# Patient Record
Sex: MALE | Race: WHITE | Employment: OTHER | URBAN - NONMETROPOLITAN AREA
[De-identification: names, ages, dates, MRNs, and addresses within clinical notes are randomized per-mention and may not be internally consistent; named-entity substitution may affect disease eponyms.]

---

## 2017-02-15 ENCOUNTER — ALLIED HEALTH/NURSE VISIT (OUTPATIENT)
Dept: FAMILY MEDICINE | Facility: CLINIC | Age: 77
End: 2017-02-15
Payer: COMMERCIAL

## 2017-02-15 VITALS — HEART RATE: 60 BPM | DIASTOLIC BLOOD PRESSURE: 76 MMHG | SYSTOLIC BLOOD PRESSURE: 124 MMHG

## 2017-02-15 DIAGNOSIS — I10 ESSENTIAL HYPERTENSION, BENIGN: Primary | ICD-10-CM

## 2017-02-15 PROCEDURE — 99207 ZZC NO CHARGE NURSE ONLY: CPT | Performed by: FAMILY MEDICINE

## 2017-02-15 NOTE — NURSING NOTE
Dave Spann is enrolled/participating in the retail pharmacy Blood Pressure Goals Achievement Program (BPGAP).  Dave Spann was evaluated at [facility] on February 15, 2017 at which time his blood pressure was:    BP Readings from Last 3 Encounters:   02/15/17 124/76   10/25/16 132/64   07/07/16 136/70     Reviewed lifestyle modifications for blood pressure control and reduction: including making healthy food choices, managing weight, getting regular exercise, smoking cessation, reducing alcohol consumption, monitoring blood pressure regularly.     Dave Spann is not experiencing symptoms.    Follow-Up: BP is at goal of < 140/90mmHg (patient 18+ years of age with or without diabetes).  Recommended follow-up at pharmacy in 6 months.     Completed by: [facility]  [phone]

## 2017-02-15 NOTE — MR AVS SNAPSHOT
After Visit Summary   2/15/2017    Dave Spann    MRN: [id number]           Patient Information     Date Of Birth          1940        Visit Information        Provider Department      2/15/2017 2:44 PM Raymon Huang MD [facility]        Today's Diagnoses     Essential hypertension, benign    -  1       Follow-ups after your visit        Who to contact     If you have questions or need follow up information about today's clinic visit or your schedule please contact [facility] directly at [phone].  Normal or non-critical lab and imaging results will be communicated to you by MyChart, letter or phone within 4 business days after the clinic has received the results. If you do not hear from us within 7 days, please contact the clinic through [employer] or phone. If you have a critical or abnormal lab result, we will notify you by phone as soon as possible.  Submit refill requests through [employer] or call your pharmacy and they will forward the refill request to us. Please allow 3 business days for your refill to be completed.          Additional Information About Your Visit        MyChart Information     [employer] gives you secure access to your electronic health record. If you see a primary care provider, you can also send messages to your care team and make appointments. If you have questions, please call your primary care clinic.  If you do not have a primary care provider, please call [phone] and they will assist you.        Care EveryWhere ID     This is your Care EveryWhere ID. This could be used by other organizations to access your [facility] medical records  [id number]        Your Vitals Were     Pulse                   60            Blood Pressure from Last 3 Encounters:   02/15/17 124/76   10/25/16 132/64   07/07/16 136/70    Weight from Last 3 Encounters:   07/07/16 176 lb 3.2 oz (79.9 kg)   04/01/16 182 lb 8.6 oz (82.8 kg)   07/22/15 171 lb  (77.6 kg)              Today, you had the following     No orders found for display       Primary Care Provider Office Phone # Fax #    Raymon Huang [phone]       [facility] [address] [facility] [address]        Thank you!     Thank you for choosing [facility]  for your care. Our goal is always to provide you with excellent care. Hearing back from our patients is one way we can continue to improve our services. Please take a few minutes to complete the written survey that you may receive in the mail after your visit with us. Thank you!             Your Updated Medication List - Protect others around you: Learn how to safely use, store and throw away your medicines at www.disposemymeds.org.          This list is accurate as of: 2/15/17  2:47 PM.  Always use your most recent med list.                   Brand Name Dispense Instructions for use    aspirin 81 MG tablet      1 TABLET DAILY       atorvastatin 40 MG tablet    LIPITOR    90 tablet    Take 1 tablet (40 mg) by mouth At Bedtime       CALCIUM 500 500-250-200 MG-MG-UNIT Tabs   Generic drug:  Calcium-Magnesium-Vitamin D      Take 1 tablet by mouth 2 times daily       doxazosin 4 MG tablet    CARDURA    90 tablet    Take 1 tablet (4 mg) by mouth At Bedtime       flaxseed oil 1000 MG Caps      1 DAILY       losartan 100 MG tablet    COZAAR    90 tablet    Take 1 tablet (100 mg) by mouth daily       metoprolol 100 MG tablet    LOPRESSOR    180 tablet    Take 1 tablet (100 mg) by mouth 2 times daily       MULTI VITAMIN MENS PO      Take 1 tablet by mouth daily.

## 2017-05-30 ENCOUNTER — OFFICE VISIT (OUTPATIENT)
Dept: FAMILY MEDICINE | Facility: CLINIC | Age: 77
End: 2017-05-30
Payer: COMMERCIAL

## 2017-05-30 VITALS
HEART RATE: 67 BPM | DIASTOLIC BLOOD PRESSURE: 86 MMHG | TEMPERATURE: 98.1 F | OXYGEN SATURATION: 97 % | WEIGHT: 184 LBS | SYSTOLIC BLOOD PRESSURE: 146 MMHG | RESPIRATION RATE: 12 BRPM | HEIGHT: 69 IN | BODY MASS INDEX: 27.25 KG/M2
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
## 2017-05-30 PROCEDURE — 36415 COLL VENOUS BLD VENIPUNCTURE: CPT | Performed by: FAMILY MEDICINE

## 2017-05-30 PROCEDURE — 86618 LYME DISEASE ANTIBODY: CPT | Performed by: FAMILY MEDICINE

## 2017-05-30 PROCEDURE — 99213 OFFICE O/P EST LOW 20 MIN: CPT | Performed by: FAMILY MEDICINE

## 2017-05-30 RX ORDER — DOXYCYCLINE 100 MG/1
100 CAPSULE ORAL 2 TIMES DAILY
Qty: 28 CAPSULE | Refills: 0 | Status: SHIPPED | OUTPATIENT
Start: 2017-05-30 | End: 2017-07-12

## 2017-05-30 NOTE — NURSING NOTE
"Chief Complaint   Patient presents with     Insect Bites     had tick bite about 2 weeks ago - now itches and has ring around it       Initial There were no vitals taken for this visit. Estimated body mass index is 26.21 kg/(m^2) as calculated from the following:    Height as of 7/7/16: 5' 8.75\" (1.746 m).    Weight as of 7/7/16: 176 lb 3.2 oz (79.9 kg).  Medication Reconciliation: complete      Health Maintenance that is potentially due pending provider review:  NONE    n/a      "

## 2017-05-30 NOTE — MR AVS SNAPSHOT
After Visit Summary   5/30/2017    Dave Spann    MRN: [id number]           Patient Information     Date Of Birth          1940        Visit Information        Provider Department      5/30/2017 2:20 PM Raymon Galvez MD [facility]        Today's Diagnoses     Tick bite, initial encounter    -  1      Care Instructions    1. I will do labs and get you the results.    2. Take the antibiotic for two weeks    3. Should clear soon.           Follow-ups after your visit        Your next 10 appointments already scheduled     Jun 05, 2017  9:00 AM CDT   Return Visit with Vijaya Hernandez MD   [facility] PHYSICIAN HEART AT [facility] ([facility])    [address] [address] [phone]              Who to contact     If you have questions or need follow up information about today's clinic visit or your schedule please contact [facility] directly at [phone].  Normal or non-critical lab and imaging results will be communicated to you by [employer], letter or phone within 4 business days after the clinic has received the results. If you do not hear from us within 7 days, please contact the clinic through [employer] or phone. If you have a critical or abnormal lab result, we will notify you by phone as soon as possible.  Submit refill requests through [employer] or call your pharmacy and they will forward the refill request to us. Please allow 3 business days for your refill to be completed.          Additional Information About Your Visit        MyChart Information     [employer] gives you secure access to your electronic health record. If you see a primary care provider, you can also send messages to your care team and make appointments. If you have questions, please call your primary care clinic.  If you do not have a primary care provider, please call [phone] and they will assist you.        Care  "EveryWhere ID     This is your Care EveryWhere ID. This could be used by other organizations to access your [facility] medical records  [id number]        Your Vitals Were     Pulse Temperature Respirations Height Pulse Oximetry BMI (Body Mass Index)    67 98.1  F (36.7  C) (Tympanic) 12 5' 8.75\" (1.746 m) 97% 27.37 kg/m2       Blood Pressure from Last 3 Encounters:   05/30/17 146/86   02/15/17 124/76   10/25/16 132/64    Weight from Last 3 Encounters:   05/30/17 184 lb (83.5 kg)   07/07/16 176 lb 3.2 oz (79.9 kg)   04/01/16 182 lb 8.6 oz (82.8 kg)              Today, you had the following     No orders found for display         Today's Medication Changes          These changes are accurate as of: 5/30/17  2:43 PM.  If you have any questions, ask your nurse or doctor.               Start taking these medicines.        Dose/Directions    doxycycline 100 MG capsule   Commonly known as:  VIBRAMYCIN   Used for:  Tick bite, initial encounter   Started by:  Raymon Galvez MD        Dose:  100 mg   Take 1 capsule (100 mg) by mouth 2 times daily   Quantity:  28 capsule   Refills:  0            Where to get your medicines      These medications were sent to [facility] [address] [address]     Phone:  [phone]     doxycycline 100 MG capsule                Primary Care Provider Office Phone # Fax #    Raymon Huang [phone]       [place] [id number] [place] [address]        Thank you!     Thank you for choosing [facility]  for your care. Our goal is always to provide you with excellent care. Hearing back from our patients is one way we can continue to improve our services. Please take a few minutes to complete the written survey that you may receive in the mail after your visit with us. Thank you!             Your Updated Medication List - Protect others around you: Learn how to " safely use, store and throw away your medicines at www.disposemymeds.org.          This list is accurate as of: 5/30/17  2:43 PM.  Always use your most recent med list.                   Brand Name Dispense Instructions for use    aspirin 81 MG tablet      1 TABLET DAILY       atorvastatin 40 MG tablet    LIPITOR    90 tablet    Take 1 tablet (40 mg) by mouth At Bedtime       CALCIUM 500 500-250-200 MG-MG-UNIT Tabs   Generic drug:  Calcium-Magnesium-Vitamin D      Take 1 tablet by mouth 2 times daily       doxazosin 4 MG tablet    CARDURA    90 tablet    Take 1 tablet (4 mg) by mouth At Bedtime       doxycycline 100 MG capsule    VIBRAMYCIN    28 capsule    Take 1 capsule (100 mg) by mouth 2 times daily       flaxseed oil 1000 MG Caps      1 DAILY       losartan 100 MG tablet    COZAAR    90 tablet    Take 1 tablet (100 mg) by mouth daily       metoprolol 100 MG tablet    LOPRESSOR    180 tablet    Take 1 tablet (100 mg) by mouth 2 times daily       MULTI VITAMIN MENS PO      Take 1 tablet by mouth daily.

## 2017-05-30 NOTE — PROGRESS NOTES
SUBJECTIVE:                                                    Dave Spann is a 76 year old male who presents to clinic today for the following health issues: two week ago tick bite and now growing rash on the left tibia.     Insect Bite      Duration: 2 weeks     Description (location/character/radiation): left ankle    Intensity:  mild    Accompanying signs and symptoms: itching and red ring around site    History (similar episodes/previous evaluation): None    Precipitating or alleviating factors: None    Therapies tried and outcome: None       Problem list and histories reviewed & adjusted, as indicated.  Additional history: as documented    Patient Active Problem List   Diagnosis     Lipoma of other specified sites     TINEA VERSICOLOR     Hypertension goal BP (blood pressure) < 140/90     Adrenal adenoma     Delayed gastric emptying     OA (osteoarthritis)     BPH (benign prostatic hypertrophy)     HYPERLIPIDEMIA LDL GOAL <130     Advanced directives, counseling/discussion     Health Care Home     Prediabetes     Cataract, right eye     ASCVD (arteriosclerotic cardiovascular disease)     Tachycardia     CHF (congestive heart failure) (H)     Thyroiditis     Past Surgical History:   Procedure Laterality Date     BIOPSY  1983    Hair loss (pseudopelade)     BIOPSY  1985    Pseudopelade (Hair loss)     COLONOSCOPY  September 2007    repeat 5 years     CORONARY ANGIOGRAPHY ADULT ORDER  2/20/15     ENDOSCOPY  09/2009    Upper GI     EYE SURGERY  2013    Removal of cataracts, both eyes     EYE SURGERY  10/2013    Removal of cataracts, both eyes     HERNIA REPAIR, INGUINAL RT/LT  05/24/2010    left side     HERNIORRHAPHY INGUINAL  12/20/2010    HERNIORRHAPHY INGUINAL performed by BIBI PARKER at [facility]     PAST SURGICAL HISTORY      [place] teeth     SURGICAL HISTORY OF -   2002    Colonoscopy-Repeat in 5 years-polyp       Social History   Substance Use Topics     Smoking status: Never Smoker     Smokeless  tobacco: Never Used     Alcohol use Yes      Comment: One beer per year; sacramental wine     Family History   Problem Relation Age of Onset     CANCER Mother      breast and skin     Hypertension Mother      Breast Cancer Mother      Other Cancer Mother      Skin     Alcohol/Drug Maternal Grandmother      Genitourinary Problems Maternal Grandfather      std,     HEART DISEASE Maternal Grandfather      MI     Cancer - colorectal Maternal Grandfather      HEART DISEASE Brother      CHF,    MI     Hypertension Brother      CANCER Father      ?lymphoma     Other Cancer Father      CANCER Paternal Grandmother      Thyroid Disease Sister      Prostate Cancer No family hx of          Current Outpatient Prescriptions   Medication Sig Dispense Refill     doxycycline (VIBRAMYCIN) 100 MG capsule Take 1 capsule (100 mg) by mouth 2 times daily 28 capsule 0     metoprolol (LOPRESSOR) 100 MG tablet Take 1 tablet (100 mg) by mouth 2 times daily 180 tablet 3     atorvastatin (LIPITOR) 40 MG tablet Take 1 tablet (40 mg) by mouth At Bedtime 90 tablet 3     losartan (COZAAR) 100 MG tablet Take 1 tablet (100 mg) by mouth daily 90 tablet 3     doxazosin (CARDURA) 4 MG tablet Take 1 tablet (4 mg) by mouth At Bedtime 90 tablet 3     Calcium-Magnesium-Vitamin D (CALCIUM 500) 500-250-200 MG-MG-UNIT TABS Take 1 tablet by mouth 2 times daily       Multiple Vitamin (MULTI VITAMIN MENS PO) Take 1 tablet by mouth daily.       ASPIRIN 81 MG OR TABS 1 TABLET DAILY       FLAXSEED OIL 1000 MG OR CAPS 1 DAILY  0       Reviewed and updated as needed this visit by clinical staff  Tobacco  Allergies  Meds  Med Hx  Surg Hx  Fam Hx  Soc Hx      Reviewed and updated as needed this visit by Provider         ROS:  C: NEGATIVE for fever, chills, change in weight  E/M: NEGATIVE for ear, mouth and throat problems  R: NEGATIVE for significant cough or SOB  CV: NEGATIVE for chest pain, palpitations or peripheral edema    OBJECTIVE:                            "                         /86 (BP Location: Right arm, Patient Position: Chair, Cuff Size: Adult Regular)  Pulse 67  Temp 98.1  F (36.7  C) (Tympanic)  Resp 12  Ht 5' 8.75\" (1.746 m)  Wt 184 lb (83.5 kg)  SpO2 97%  BMI 27.37 kg/m2  Body mass index is 27.37 kg/(m^2).  GENERAL: healthy, alert and no distress  NECK: no adenopathy, no asymmetry, masses, or scars and thyroid normal to palpation  RESP: lungs clear to auscultation - no rales, rhonchi or wheezes  SKIN: rash on left tibia with bulleye lesion          ASSESSMENT/PLAN:                                                            1. Tick bite, initial encounter  possile lyme disease   - doxycycline (VIBRAMYCIN) 100 MG capsule; Take 1 capsule (100 mg) by mouth 2 times daily  Dispense: 28 capsule; Refill: 0    ASSESSMENT/PLAN:      ICD-10-CM    1. Tick bite, initial encounter W57.XXXA doxycycline (VIBRAMYCIN) 100 MG capsule       Patient Instructions   1. I will do labs and get you the results.    2. Take the antibiotic for two weeks    3. Should clear soon.           Raymon Galvez MD  [facility]  "

## 2017-05-30 NOTE — PATIENT INSTRUCTIONS
1. I will do labs and get you the results.    2. Take the antibiotic for two weeks    3. Should clear soon.

## 2017-06-01 LAB — B BURGDOR IGG+IGM SER QL: 0.04 (ref 0–0.89)

## 2017-06-05 ENCOUNTER — OFFICE VISIT (OUTPATIENT)
Dept: CARDIOLOGY | Facility: CLINIC | Age: 77
End: 2017-06-05
Attending: INTERNAL MEDICINE
Payer: COMMERCIAL

## 2017-06-05 VITALS
WEIGHT: 185.2 LBS | BODY MASS INDEX: 27.55 KG/M2 | OXYGEN SATURATION: 98 % | HEART RATE: 60 BPM | SYSTOLIC BLOOD PRESSURE: 152 MMHG | DIASTOLIC BLOOD PRESSURE: 76 MMHG

## 2017-06-05 DIAGNOSIS — I25.10 CORONARY ARTERY DISEASE INVOLVING NATIVE HEART WITHOUT ANGINA PECTORIS, UNSPECIFIED VESSEL OR LESION TYPE: ICD-10-CM

## 2017-06-05 PROCEDURE — 99214 OFFICE O/P EST MOD 30 MIN: CPT | Performed by: INTERNAL MEDICINE

## 2017-06-05 RX ORDER — HYDROCHLOROTHIAZIDE 12.5 MG/1
12.5 TABLET ORAL DAILY
Qty: 90 TABLET | Refills: 3 | Status: SHIPPED | OUTPATIENT
Start: 2017-06-05 | End: 2018-05-30

## 2017-06-05 NOTE — MR AVS SNAPSHOT
After Visit Summary   6/5/2017    Dave Spann    MRN: [id number]           Patient Information     Date Of Birth          1940        Visit Information        Provider Department      6/5/2017 9:00 AM Vijaya Hernandez MD [facility] PHYSICIAN HEART AT [facility]        Today's Diagnoses     Coronary artery disease involving native heart without angina pectoris, unspecified vessel or lesion type          Care Instructions    Thank you for your  Heart Care visit today. Your provider has recommended the following:  Medication Changes:  Start HCTZ 12.5mg daily    Recommendations:  Have lab work next week  Have Echocardiogram in 1 year    Follow-up:  See Dr. Hernandez for cardiology follow up in 1 year.    We kindly ask that you call cardiology scheduling at [phone] three months prior to requested revisit date to schedule future cardiology appointments.  Reminder:  1. Please bring in your current medication list or your medication, over the counter supplements and vitamin bottles as we will review these at each office visit.               [facility]~[address]~[place], [place]~[address]  Questions about your visit today?  Call your Cardiology Clinic RN's-Belén Pena and/or Jackie Leach at [phone].          Follow-ups after your visit        Additional Services     Follow-Up with Cardiologist                 Future tests that were ordered for you today     Open Future Orders        Priority Expected Expires Ordered    Echocardiogram Routine 6/5/2018 7/10/2018 6/5/2017    Follow-Up with Cardiologist Routine 6/5/2018 10/18/2018 6/5/2017    Basic metabolic panel Routine 6/12/2017 6/5/2018 6/5/2017            Who to contact     If you have questions or need follow up information about today's clinic visit or your schedule please contact [facility] PHYSICIAN HEART AT [facility] directly at  [phone].  Normal or non-critical lab and imaging results will be communicated to you by MyChart, letter or phone within 4 business days after the clinic has received the results. If you do not hear from us within 7 days, please contact the clinic through [employer] or phone. If you have a critical or abnormal lab result, we will notify you by phone as soon as possible.  Submit refill requests through [employer] or call your pharmacy and they will forward the refill request to us. Please allow 3 business days for your refill to be completed.          Additional Information About Your Visit        [employer] Information     [employer] gives you secure access to your electronic health record. If you see a primary care provider, you can also send messages to your care team and make appointments. If you have questions, please call your primary care clinic.  If you do not have a primary care provider, please call [phone] and they will assist you.        Care EveryWhere ID     This is your Care EveryWhere ID. This could be used by other organizations to access your [facility] medical records  [id number]        Your Vitals Were     Pulse Pulse Oximetry BMI (Body Mass Index)             60 98% 27.55 kg/m2          Blood Pressure from Last 3 Encounters:   06/05/17 152/76   05/30/17 146/86   02/15/17 124/76    Weight from Last 3 Encounters:   06/05/17 84 kg (185 lb 3.2 oz)   05/30/17 83.5 kg (184 lb)   07/07/16 79.9 kg (176 lb 3.2 oz)              We Performed the Following     Follow-Up with Cardiologist          Today's Medication Changes          These changes are accurate as of: 6/5/17  9:09 AM.  If you have any questions, ask your nurse or doctor.               Start taking these medicines.        Dose/Directions    hydrochlorothiazide 12.5 MG Tabs tablet   Used for:  Coronary artery disease involving native heart without angina pectoris, unspecified vessel or lesion type   Started by:  Vijaya Hernandez MD         Dose:  12.5 mg   Take 1 tablet (12.5 mg) by mouth daily   Quantity:  90 tablet   Refills:  3            Where to get your medicines      These medications were sent to [facility] - [address] [facility] [address]     Phone:  [phone]     hydrochlorothiazide 12.5 MG Tabs tablet                Primary Care Provider Office Phone # Fax #    Raymon Huang [phone]       [facility] [address] [facility] [address]        Thank you!     Thank you for choosing [facility] PHYSICIAN HEART AT [facility]  for your care. Our goal is always to provide you with excellent care. Hearing back from our patients is one way we can continue to improve our services. Please take a few minutes to complete the written survey that you may receive in the mail after your visit with us. Thank you!             Your Updated Medication List - Protect others around you: Learn how to safely use, store and throw away your medicines at www.disposemymeds.org.          This list is accurate as of: 6/5/17  9:09 AM.  Always use your most recent med list.                   Brand Name Dispense Instructions for use    aspirin 81 MG tablet      1 TABLET DAILY       atorvastatin 40 MG tablet    LIPITOR    90 tablet    Take 1 tablet (40 mg) by mouth At Bedtime       CALCIUM 500 500-250-200 MG-MG-UNIT Tabs   Generic drug:  Calcium-Magnesium-Vitamin D      Take 1 tablet by mouth 2 times daily       doxazosin 4 MG tablet    CARDURA    90 tablet    Take 1 tablet (4 mg) by mouth At Bedtime       doxycycline 100 MG capsule    VIBRAMYCIN    28 capsule    Take 1 capsule (100 mg) by mouth 2 times daily       flaxseed oil 1000 MG Caps      1 DAILY       hydrochlorothiazide 12.5 MG Tabs tablet     90 tablet    Take 1 tablet (12.5 mg) by mouth daily       losartan 100 MG tablet    COZAAR    90 tablet    Take 1 tablet (100 mg) by mouth daily       metoprolol  100 MG tablet    LOPRESSOR    180 tablet    Take 1 tablet (100 mg) by mouth 2 times daily       MULTI VITAMIN MENS PO      Take 1 tablet by mouth daily.

## 2017-06-05 NOTE — PROGRESS NOTES
Dear Dr. Huang:      I had the pleasure to follow up with your patient, Mr. Dave Spann, in the Cardiovascular Medicine Clinic.  As you recall, this is a gentleman who in Feb 2015 was transferred to [facility] for coronary angiogram.  Briefly, this study was performed due to chest discomfort with exertion.  There was concern regarding this representing a high-grade coronary artery lesion.  He underwent coronary angiogram via right radial approach demonstrating intermediate disease in the LAD which was evaluated by fractional flow reserve assessment.  This, fortunately, was 0.84 suggestive of nonobstructive disease.  He also was uncovered to have hyperthyroidism and has been maintained on beta blocker therapy since that time.  He is scheduled to have an evaluation next week for this and treatment as well.  From a cardiac perspective, he reports no additional symptoms.  He has gone back to his normal level of functioning.  In the winter months, he is quite active in the garage.  He likes woodworking and in the summer he has a large garden which he tends to.      Had thyroiditis which has since resolved.    He has no active cardiac complaints. LE edema with Norvasc which has been discontinued.  BP has been elevated on office visits and home readings.       PHYSICAL EXAMINATION:   VITAL SIGNS:  Blood pressure 152/76, pulse 60, weight 84 kg (185 lb 3.2 oz), SpO2 98 %.  GENERAL:  The patient is alert, oriented, well-dressed, well-appearing male in no apparent distress.   HEENT:  Oropharynx clear, no sinus tenderness.   NECK:  No JVP, no lymphadenopathy, no carotid bruits.   CARDIOVASCULAR:  Distant heart tones, normal S1 and S2.  No murmurs, gallops or rubs.   LUNGS:  Clear to auscultation.   ABDOMEN:  Soft, nontender and nondistended.      ASSESSMENT AND PLAN:   1. Atherosclerotic coronary disease, mild, nonobstructive.   2. Hypertension.   3. Hyperlipidemia.        RECOMMENDATIONS:   1. Atherosclerotic  coronary artery disease.  At this point, the patient is stable.  We have reviewed his recent coronary angiogram.    2. Hyperlipidemia.  Let us continue with his atorvastatin.   3. HTN:  On lopressor and losartan.  Will add HCTZ 12.5 mg and check BMP in ~ 2 weeks at [place]   4. RTC in one year with pre clinic ehocardiogram    Vijaya Hernandez MD

## 2017-06-05 NOTE — PATIENT INSTRUCTIONS
Thank you for your [facility] visit today. Your provider has recommended the following:  Medication Changes:  Start HCTZ 12.5mg daily    Recommendations:  Have lab work next week  Have Echocardiogram in 1 year    Follow-up:  See Dr. Hernandez for cardiology follow up in 1 year.    We kindly ask that you call cardiology scheduling at [phone] three months prior to requested revisit date to schedule future cardiology appointments.  Reminder:  1. Please bring in your current medication list or your medication, over the counter supplements and vitamin bottles as we will review these at each office visit.               [facility]  [facility]~[address]~[place], [place]~[address]  Questions about your visit today?  Call your Cardiology Clinic RN's-Belén Pena and/or Jackie Leach at [phone].

## 2017-06-05 NOTE — LETTER
6/5/2017    Raymon Huang MD  [facility]   [address] [facility] [address]    RE: Dave Spann       Dear Colleague,    I had the pleasure to follow up with your patient, Mr. Dave Spann, in the Cardiovascular Medicine Clinic.  As you recall, this is a gentleman who in Feb 2015 was transferred to [facility] for coronary angiogram.  Briefly, this study was performed due to chest discomfort with exertion.  There was concern regarding this representing a high-grade coronary artery lesion.  He underwent coronary angiogram via right radial approach demonstrating intermediate disease in the LAD which was evaluated by fractional flow reserve assessment.  This, fortunately, was 0.84 suggestive of nonobstructive disease.  He also was uncovered to have hyperthyroidism and has been maintained on beta blocker therapy since that time.  He is scheduled to have an evaluation next week for this and treatment as well.  From a cardiac perspective, he reports no additional symptoms.  He has gone back to his normal level of functioning.  In the winter months, he is quite active in the garage.  He likes woodworking and in the summer he has a large garden which he tends to.      Had thyroiditis which has since resolved.    He has no active cardiac complaints. LE edema with Norvasc which has been discontinued.  BP has been elevated on office visits and home readings.       PHYSICAL EXAMINATION:   VITAL SIGNS:  Blood pressure 152/76, pulse 60, weight 84 kg (185 lb 3.2 oz), SpO2 98 %.  GENERAL:  The patient is alert, oriented, well-dressed, well-appearing male in no apparent distress.   HEENT:  Oropharynx clear, no sinus tenderness.   NECK:  No JVP, no lymphadenopathy, no carotid bruits.   CARDIOVASCULAR:  Distant heart tones, normal S1 and S2.  No murmurs, gallops or rubs.   LUNGS:  Clear to auscultation.   ABDOMEN:  Soft, nontender and nondistended.      ASSESSMENT AND PLAN:   1. Atherosclerotic  coronary disease, mild, nonobstructive.   2. Hypertension.   3. Hyperlipidemia.        RECOMMENDATIONS:   1. Atherosclerotic coronary artery disease.  At this point, the patient is stable.  We have reviewed his recent coronary angiogram.    2. Hyperlipidemia.  Let us continue with his atorvastatin.   3. HTN:  On lopressor and losartan.  Will add HCTZ 12.5 mg and check BMP in ~ 2 weeks at [place]   4. RTC in one year with pre clinic ehocardiogram    Thank you for allowing me to participate in the care of your patient.    Sincerely,     Vijaya Hernandez MD     [facility]

## 2017-06-13 DIAGNOSIS — I25.10 CORONARY ARTERY DISEASE INVOLVING NATIVE HEART WITHOUT ANGINA PECTORIS, UNSPECIFIED VESSEL OR LESION TYPE: ICD-10-CM

## 2017-06-13 LAB
ANION GAP SERPL CALCULATED.3IONS-SCNC: 6 MMOL/L (ref 3–14)
BUN SERPL-MCNC: 23 MG/DL (ref 7–30)
CALCIUM SERPL-MCNC: 9.3 MG/DL (ref 8.5–10.1)
CHLORIDE SERPL-SCNC: 102 MMOL/L (ref 94–109)
CO2 SERPL-SCNC: 26 MMOL/L (ref 20–32)
CREAT SERPL-MCNC: 0.98 MG/DL (ref 0.66–1.25)
GFR SERPL CREATININE-BSD FRML MDRD: 75 ML/MIN/1.7M2
GLUCOSE SERPL-MCNC: 111 MG/DL (ref 70–99)
POTASSIUM SERPL-SCNC: 4 MMOL/L (ref 3.4–5.3)
SODIUM SERPL-SCNC: 134 MMOL/L (ref 133–144)

## 2017-06-13 PROCEDURE — 80048 BASIC METABOLIC PNL TOTAL CA: CPT | Performed by: FAMILY MEDICINE

## 2017-06-13 PROCEDURE — 36415 COLL VENOUS BLD VENIPUNCTURE: CPT | Performed by: FAMILY MEDICINE

## 2017-07-01 DIAGNOSIS — E78.5 HYPERLIPIDEMIA LDL GOAL <130: ICD-10-CM

## 2017-07-03 RX ORDER — ATORVASTATIN CALCIUM 40 MG/1
TABLET, FILM COATED ORAL
Qty: 30 TABLET | Refills: 0 | Status: SHIPPED | OUTPATIENT
Start: 2017-07-03 | End: 2017-07-12

## 2017-07-03 NOTE — TELEPHONE ENCOUNTER
atorvastatin (LIPITOR) 40 MG tablet     Last Written Prescription Date: 07/07/2016  Last Fill Quantity: 90 tablet, # refills: 3  Last Office Visit with G, [facility] or [facility] prescribing provider: 05/03/2017  Next 5 appointments (look out 90 days)     Jul 12, 2017  8:40 AM CDT   PHYSICAL with Raymon Huang MD   [facility] ([facility]    [address] [place] [id number] [phone]                   Lab Results   Component Value Date    CHOL 104 07/07/2016     Lab Results   Component Value Date    HDL 50 07/07/2016     Lab Results   Component Value Date    LDL 44 07/07/2016     Lab Results   Component Value Date    TRIG 51 07/07/2016     Lab Results   Component Value Date    CHOLHDLRATIO 2.1 02/21/2015

## 2017-07-12 ENCOUNTER — OFFICE VISIT (OUTPATIENT)
Dept: FAMILY MEDICINE | Facility: CLINIC | Age: 77
End: 2017-07-12
Payer: COMMERCIAL

## 2017-07-12 VITALS
RESPIRATION RATE: 16 BRPM | DIASTOLIC BLOOD PRESSURE: 70 MMHG | SYSTOLIC BLOOD PRESSURE: 130 MMHG | WEIGHT: 175 LBS | HEIGHT: 69 IN | BODY MASS INDEX: 25.92 KG/M2 | HEART RATE: 60 BPM

## 2017-07-12 DIAGNOSIS — I10 ESSENTIAL HYPERTENSION WITH GOAL BLOOD PRESSURE LESS THAN 140/90: ICD-10-CM

## 2017-07-12 DIAGNOSIS — I50.9 CONGESTIVE HEART FAILURE, UNSPECIFIED CONGESTIVE HEART FAILURE CHRONICITY, UNSPECIFIED CONGESTIVE HEART FAILURE TYPE: ICD-10-CM

## 2017-07-12 DIAGNOSIS — Z00.00 MEDICARE ANNUAL WELLNESS VISIT, SUBSEQUENT: Primary | ICD-10-CM

## 2017-07-12 DIAGNOSIS — Z12.11 SPECIAL SCREENING FOR MALIGNANT NEOPLASMS, COLON: ICD-10-CM

## 2017-07-12 DIAGNOSIS — E78.5 HYPERLIPIDEMIA LDL GOAL <130: ICD-10-CM

## 2017-07-12 DIAGNOSIS — E06.9 THYROIDITIS: ICD-10-CM

## 2017-07-12 DIAGNOSIS — N40.1 BENIGN NON-NODULAR PROSTATIC HYPERPLASIA WITH LOWER URINARY TRACT SYMPTOMS: ICD-10-CM

## 2017-07-12 DIAGNOSIS — I25.10 ASCVD (ARTERIOSCLEROTIC CARDIOVASCULAR DISEASE): ICD-10-CM

## 2017-07-12 LAB
CHOLEST SERPL-MCNC: 109 MG/DL
HDLC SERPL-MCNC: 46 MG/DL
LDLC SERPL CALC-MCNC: 48 MG/DL
NONHDLC SERPL-MCNC: 63 MG/DL
TRIGL SERPL-MCNC: 73 MG/DL
TSH SERPL DL<=0.05 MIU/L-ACNC: 0.95 MU/L (ref 0.4–4)

## 2017-07-12 PROCEDURE — 99397 PER PM REEVAL EST PAT 65+ YR: CPT | Performed by: FAMILY MEDICINE

## 2017-07-12 PROCEDURE — 84443 ASSAY THYROID STIM HORMONE: CPT | Performed by: FAMILY MEDICINE

## 2017-07-12 PROCEDURE — 36415 COLL VENOUS BLD VENIPUNCTURE: CPT | Performed by: FAMILY MEDICINE

## 2017-07-12 PROCEDURE — 80061 LIPID PANEL: CPT | Performed by: FAMILY MEDICINE

## 2017-07-12 RX ORDER — DOXAZOSIN 8 MG/1
8 TABLET ORAL AT BEDTIME
Qty: 90 TABLET | Refills: 3 | Status: SHIPPED | OUTPATIENT
Start: 2017-07-12 | End: 2018-07-16

## 2017-07-12 RX ORDER — METOPROLOL TARTRATE 100 MG
100 TABLET ORAL 2 TIMES DAILY
Qty: 180 TABLET | Refills: 3 | Status: SHIPPED | OUTPATIENT
Start: 2017-07-12 | End: 2018-07-16

## 2017-07-12 RX ORDER — LOSARTAN POTASSIUM 100 MG/1
100 TABLET ORAL DAILY
Qty: 90 TABLET | Refills: 3 | Status: SHIPPED | OUTPATIENT
Start: 2017-07-12 | End: 2018-07-16

## 2017-07-12 RX ORDER — ATORVASTATIN CALCIUM 40 MG/1
TABLET, FILM COATED ORAL
Qty: 90 TABLET | Refills: 3 | Status: SHIPPED | OUTPATIENT
Start: 2017-07-12 | End: 2018-07-16

## 2017-07-12 NOTE — PATIENT INSTRUCTIONS
Preventive Health Recommendations:       Male Ages 65 and over    Yearly exam:             See your health care provider every year in order to  o   Review health changes.   o   Discuss preventive care.    o   Review your medicines if your doctor has prescribed any.    Talk with your health care provider about whether you should have a test to screen for prostate cancer (PSA).    Every 3 years, have a diabetes test (fasting glucose). If you are at risk for diabetes, you should have this test more often.    Every 5 years, have a cholesterol test. Have this test more often if you are at risk for high cholesterol or heart disease.     Every 10 years, have a colonoscopy. Or, have a yearly FIT test (stool test). These exams will check for colon cancer.    Talk to with your health care provider about screening for Abdominal Aortic Aneurysm if you have a family history of AAA or have a history of smoking.  Shots:     Get a flu shot each year.     Get a tetanus shot every 10 years.     Talk to your doctor about your pneumonia vaccines. There are now two you should receive - Pneumovax (PPSV 23) and Prevnar (PCV 13).    Talk to your doctor about a shingles vaccine.     Talk to your doctor about the hepatitis B vaccine.  Nutrition:     Eat at least 5 servings of fruits and vegetables each day.     Eat whole-grain bread, whole-wheat pasta and brown rice instead of white grains and rice.     Talk to your doctor about Calcium and Vitamin D.   Lifestyle    Exercise for at least 150 minutes a week (30 minutes a day, 5 days a week). This will help you control your weight and prevent disease.     Limit alcohol to one drink per day.     No smoking.     Wear sunscreen to prevent skin cancer.     See your dentist every six months for an exam and cleaning.     See your eye doctor every 1 to 2 years to screen for conditions such as glaucoma, macular degeneration and cataracts.    ASSESSMENT/PLAN:                                                     Lifestyle recommendations:continue current healthy lifestyle efforts including regular exercise and keeping a good weight  The following exams/tests were recommended and discussed for health maintenance:  Colonoscopy test is done at the hospital and if normal, no other testing is needed for 10 years.   When to stop colon cancer screening?  Experts agree that colon cancer screening is generally not recommended when life expectancy is <10 years and not at age over 85.  The Expert group USPSTF recommends against screening for ages 76 and older.  It is reasonable to stop screening for colon cancer sometime between age 76 and 85 for most individuals.   Prostate cancer screening is not recommended for older men, those age 70 and older or with anticipated lifespan <10 years.  The expert group USPSTF rcommmends against any PSA prostate cancer screening.    (Z00.00) Medicare annual wellness visit, subsequent  (primary encounter diagnosis)    (E78.5) Hyperlipidemia LDL goal <130  Comment: due for recheck  Plan: Lipid panel reflex to direct LDL, atorvastatin         (LIPITOR) 40 MG tablet        Fasting blood tests today.     (I25.10) ASCVD (arteriosclerotic cardiovascular disease)  Comment: doing well  Plan: metoprolol (LOPRESSOR) 100 MG tablet        No change in current treatment plan.     (I10) Essential hypertension with goal blood pressure less than 140/90  Comment: doing well  Plan: metoprolol (LOPRESSOR) 100 MG tablet, losartan         (COZAAR) 100 MG tablet, doxazosin (CARDURA) 8         MG tablet        No change in current treatment plan.  The Doxazosin is being increased for prostate symptoms.  This could lower blood pressure.     (N40.1) Benign non-nodular prostatic hyperplasia with lower urinary tract symptoms  Comment:   Plan: doxazosin (CARDURA) 8 MG tablet        Increase to 8mg daily to see if it helps with frequent urination at night.     (E06.9) Thyroiditis  Comment: check thyroid again today.    Plan: TSH          (I50.9) Congestive heart failure, unspecified congestive heart failure chronicity, unspecified congestive heart failure type (H)  Comment: stable  Plan: No change in current treatment plan.     (Z12.11) Special screening for malignant neoplasms, colon  Comment:   Plan: GASTROENTEROLOGY ADULT REF PROCEDURE ONLY        Schedule an appointment for colonoscopies and gastroscopies with surgery scheduling.  Call [phone] to schedule the procedures.

## 2017-07-12 NOTE — MR AVS SNAPSHOT
After Visit Summary   7/12/2017    Dave Spann    MRN: [id number]           Patient Information     Date Of Birth          1940        Visit Information        Provider Department      7/12/2017 8:40 AM Raymon Huang MD [facility]        Today's Diagnoses     Medicare annual wellness visit, subsequent    -  1    Hyperlipidemia LDL goal <130        ASCVD (arteriosclerotic cardiovascular disease)        Essential hypertension with goal blood pressure less than 140/90        Benign non-nodular prostatic hyperplasia with lower urinary tract symptoms        Thyroiditis        Congestive heart failure, unspecified congestive heart failure chronicity, unspecified congestive heart failure type (H)        Special screening for malignant neoplasms, colon          Care Instructions      Preventive Health Recommendations:       Male Ages 65 and over    Yearly exam:             See your health care provider every year in order to  o   Review health changes.   o   Discuss preventive care.    o   Review your medicines if your doctor has prescribed any.    Talk with your health care provider about whether you should have a test to screen for prostate cancer (PSA).    Every 3 years, have a diabetes test (fasting glucose). If you are at risk for diabetes, you should have this test more often.    Every 5 years, have a cholesterol test. Have this test more often if you are at risk for high cholesterol or heart disease.     Every 10 years, have a colonoscopy. Or, have a yearly FIT test (stool test). These exams will check for colon cancer.    Talk to with your health care provider about screening for Abdominal Aortic Aneurysm if you have a family history of AAA or have a history of smoking.  Shots:     Get a flu shot each year.     Get a tetanus shot every 10 years.     Talk to your doctor about your pneumonia vaccines. There are now two you should receive - Pneumovax (PPSV 23) and  Prevnar (PCV 13).    Talk to your doctor about a shingles vaccine.     Talk to your doctor about the hepatitis B vaccine.  Nutrition:     Eat at least 5 servings of fruits and vegetables each day.     Eat whole-grain bread, whole-wheat pasta and brown rice instead of white grains and rice.     Talk to your doctor about Calcium and Vitamin D.   Lifestyle    Exercise for at least 150 minutes a week (30 minutes a day, 5 days a week). This will help you control your weight and prevent disease.     Limit alcohol to one drink per day.     No smoking.     Wear sunscreen to prevent skin cancer.     See your dentist every six months for an exam and cleaning.     See your eye doctor every 1 to 2 years to screen for conditions such as glaucoma, macular degeneration and cataracts.    ASSESSMENT/PLAN:                                                    Lifestyle recommendations:continue current healthy lifestyle efforts including regular exercise and keeping a good weight  The following exams/tests were recommended and discussed for health maintenance:  Colonoscopy test is done at the hospital and if normal, no other testing is needed for 10 years.   When to stop colon cancer screening?  Experts agree that colon cancer screening is generally not recommended when life expectancy is <10 years and not at age over 85.  The Expert group USPSTF recommends against screening for ages 76 and older.  It is reasonable to stop screening for colon cancer sometime between age 76 and 85 for most individuals.   Prostate cancer screening is not recommended for older men, those age 70 and older or with anticipated lifespan <10 years.  The expert group USPSTF rcommmends against any PSA prostate cancer screening.    (Z00.00) Medicare annual wellness visit, subsequent  (primary encounter diagnosis)    (E78.5) Hyperlipidemia LDL goal <130  Comment: due for recheck  Plan: Lipid panel reflex to direct LDL, atorvastatin         (LIPITOR) 40 MG tablet         Fasting blood tests today.     (I25.10) ASCVD (arteriosclerotic cardiovascular disease)  Comment: doing well  Plan: metoprolol (LOPRESSOR) 100 MG tablet        No change in current treatment plan.     (I10) Essential hypertension with goal blood pressure less than 140/90  Comment: doing well  Plan: metoprolol (LOPRESSOR) 100 MG tablet, losartan         (COZAAR) 100 MG tablet, doxazosin (CARDURA) 8         MG tablet        No change in current treatment plan.  The Doxazosin is being increased for prostate symptoms.  This could lower blood pressure.     (N40.1) Benign non-nodular prostatic hyperplasia with lower urinary tract symptoms  Comment:   Plan: doxazosin (CARDURA) 8 MG tablet        Increase to 8mg daily to see if it helps with frequent urination at night.     (E06.9) Thyroiditis  Comment: check thyroid again today.   Plan: TSH          (I50.9) Congestive heart failure, unspecified congestive heart failure chronicity, unspecified congestive heart failure type (H)  Comment: stable  Plan: No change in current treatment plan.     (Z12.11) Special screening for malignant neoplasms, colon  Comment:   Plan: GASTROENTEROLOGY ADULT REF PROCEDURE ONLY        Schedule an appointment for colonoscopies and gastroscopies with surgery scheduling.  Call [phone] to schedule the procedures.          Follow-ups after your visit        Additional Services     GASTROENTEROLOGY ADULT REF PROCEDURE ONLY       Last Lab Result: Creatinine (mg/dL)       Date                     Value                 06/13/2017               0.98             ----------  Body mass index is 25.84 kg/(m^2).      Patient will be contacted to schedule procedure.     Please be aware that coverage of these services is subject to the terms and limitations of your health insurance plan.  Call member services at your health plan with any benefit or coverage questions.  Any procedures must be performed at a [facility] facility OR coordinated by your  "clinic's referral office.    Please bring the following with you to your appointment:    (1) Any X-Rays, CTs or MRIs which have been performed.  Contact the facility where they were done to arrange for  prior to your scheduled appointment.    (2) List of current medications   (3) This referral request   (4) Any documents/labs given to you for this referral                  Who to contact     If you have questions or need follow up information about today's clinic visit or your schedule please contact [facility] directly at [phone].  Normal or non-critical lab and imaging results will be communicated to you by SwiftPayMD(TM) by Icon[employer] Datahart, letter or phone within 4 business days after the clinic has received the results. If you do not hear from us within 7 days, please contact the clinic through Ascenzt or phone. If you have a critical or abnormal lab result, we will notify you by phone as soon as possible.  Submit refill requests through Tipbit or call your pharmacy and they will forward the refill request to us. Please allow 3 business days for your refill to be completed.          Additional Information About Your Visit        SwiftPayMD(TM) by Icon[employer] DataharEventap Information     Tipbit gives you secure access to your electronic health record. If you see a primary care provider, you can also send messages to your care team and make appointments. If you have questions, please call your primary care clinic.  If you do not have a primary care provider, please call [phone] and they will assist you.        Care EveryWhere ID     This is your Care EveryWhere ID. This could be used by other organizations to access your [place] medical records  [id number]        Your Vitals Were     Pulse Respirations Height BMI (Body Mass Index)          60 16 5' 9\" (1.753 m) 25.84 kg/m2         Blood Pressure from Last 3 Encounters:   07/12/17 130/70   06/05/17 152/76   05/30/17 146/86    Weight from Last 3 Encounters:   07/12/17 175 lb (79.4 kg) "   06/05/17 185 lb 3.2 oz (84 kg)   05/30/17 184 lb (83.5 kg)              We Performed the Following     GASTROENTEROLOGY ADULT REF PROCEDURE ONLY     Lipid panel reflex to direct LDL     TSH          Today's Medication Changes          These changes are accurate as of: 7/12/17  9:38 AM.  If you have any questions, ask your nurse or doctor.               These medicines have changed or have updated prescriptions.        Dose/Directions    atorvastatin 40 MG tablet   Commonly known as:  LIPITOR   This may have changed:  See the new instructions.   Used for:  Hyperlipidemia LDL goal <130        TAKE ONE TABLET BY MOUTH AT BEDTIME   Quantity:  90 tablet   Refills:  3       doxazosin 8 MG tablet   Commonly known as:  CARDURA   This may have changed:    - medication strength  - how much to take   Used for:  Essential hypertension with goal blood pressure less than 140/90, Benign non-nodular prostatic hyperplasia with lower urinary tract symptoms        Dose:  8 mg   Take 1 tablet (8 mg) by mouth At Bedtime   Quantity:  90 tablet   Refills:  3            Where to get your medicines      These medications were sent to [address] [address]     Phone:  [phone]     atorvastatin 40 MG tablet    doxazosin 8 MG tablet    losartan 100 MG tablet    metoprolol 100 MG tablet                Primary Care Provider Office Phone # Fax #    Raymon Huang [phone]       [address] [address]        Equal Access to Services     DEYSI CHEUNG : Cortes herr Solynne, waaxda luqadaha, qaybta kaalmada licha, judy gee. So [facility] [phone].    ATENCIÓN: Si habla español, tiene a mancuso disposición servicios gratuitos de asistencia lingüística. Adelaida al [phone].    We comply with applicable federal civil rights laws and [place] laws. We do not discriminate  on the basis of race, color, national origin, age, disability sex, sexual orientation or gender identity.            Thank you!     Thank you for choosing [facility]  for your care. Our goal is always to provide you with excellent care. Hearing back from our patients is one way we can continue to improve our services. Please take a few minutes to complete the written survey that you may receive in the mail after your visit with us. Thank you!             Your Updated Medication List - Protect others around you: Learn how to safely use, store and throw away your medicines at www.disposemymeds.org.          This list is accurate as of: 7/12/17  9:38 AM.  Always use your most recent med list.                   Brand Name Dispense Instructions for use Diagnosis    aspirin 81 MG tablet      1 TABLET DAILY        atorvastatin 40 MG tablet    LIPITOR    90 tablet    TAKE ONE TABLET BY MOUTH AT BEDTIME    Hyperlipidemia LDL goal <130       CALCIUM 500 500-250-200 MG-MG-UNIT Tabs   Generic drug:  Calcium-Magnesium-Vitamin D      Take 1 tablet by mouth 2 times daily        doxazosin 8 MG tablet    CARDURA    90 tablet    Take 1 tablet (8 mg) by mouth At Bedtime    Essential hypertension with goal blood pressure less than 140/90, Benign non-nodular prostatic hyperplasia with lower urinary tract symptoms       flaxseed oil 1000 MG Caps      1 DAILY        hydrochlorothiazide 12.5 MG Tabs tablet     90 tablet    Take 1 tablet (12.5 mg) by mouth daily    Coronary artery disease involving native heart without angina pectoris, unspecified vessel or lesion type       losartan 100 MG tablet    COZAAR    90 tablet    Take 1 tablet (100 mg) by mouth daily    Essential hypertension with goal blood pressure less than 140/90       metoprolol 100 MG tablet    LOPRESSOR    180 tablet    Take 1 tablet (100 mg) by mouth 2 times daily    ASCVD (arteriosclerotic cardiovascular disease), Essential hypertension with goal blood  pressure less than 140/90       MULTI VITAMIN MENS PO      Take 1 tablet by mouth daily.

## 2017-07-12 NOTE — PROGRESS NOTES
SUBJECTIVE:   Dave Spann is a 76 year old male who presents for Preventive Visit.  Chief Complaint   Patient presents with     Medicare Visit      Treated for tick bite and rash on leg a month ago with doxycycline for 2 weeks.  RAsh cleared.  Feeling oK.  NO systemic symptoms.  Test for Lyme negative.     He has seen cardiology on 6/5/2017.  Added hydrochlorothiazide for hypertension.  NO side effects noted.  BP checked only once after starting medication.        Are you in the first 12 months of your Medicare Part B coverage?  No    Healthy Habits:  Annual Exam:  Getting at least 3 servings of Calcium per day:: Yes  Bi-annual eye exam:: Yes  Dental care twice a year:: Yes  Sleep apnea or symptoms of sleep apnea:: None  Diet:: Low salt, Low fat/cholesterol  Frequency of exercise:: None-some gardening and cutting brush.   Taking medications regularly:: Yes  Medication side effects:: None  Additional concerns today:: No  PHQ-2 Score: 1    COGNITIVE SCREEN  1) Repeat 3 items (Banana, Sunrise, Chair)    2) Clock draw: NORMAL  3) 3 item recall: Recalls 3 objects  Results: 3 items recalled: COGNITIVE IMPAIRMENT LESS LIKELY    Mini-CogTM Copyright S Lokesh. Licensed by the author for use in [facility]; reprinted with permission (soob@[facility]). All rights reserved.      Social History   Substance Use Topics     Smoking status: Never Smoker     Smokeless tobacco: Never Used     Alcohol use Yes      Comment: One beer per year; sacramental wine     The patient does not drink >3 drinks per day nor >7 drinks per week.    Today's PHQ-2 Score:   PHQ-2 ( 1999 [employer]) 7/9/2017 5/30/2017   Q1: Little interest or pleasure in doing things 0 0   Q2: Feeling down, depressed or hopeless 1 0   PHQ-2 Score 1 0   Q1: Little interest or pleasure in doing things Not at all -   Q2: Feeling down, depressed or hopeless Several days -   PHQ-2 Score 1 -     Do you feel safe in your environment - Yes    Do you have a Health  Care Directive?: discussed    Current providers sharing in care for this patient include:   Patient Care Team:  Raymon Huang MD as PCP - General (Family Practice)  Cardiology.     Hearing impairment: slight    Ability to successfully perform activities of daily living: Yes, no assistance needed     Fall risk:    Home safety:  discussed    The following health maintenance items are reviewed in Epic and correct as of today:  Health Maintenance   Topic Date Due     AORTIC ANEURYSM SCREENING (SYSTEM ASSIGNED)  07/29/2007     FALL RISK ASSESSMENT  07/07/2017     INFLUENZA VACCINE (SYSTEM ASSIGNED)  09/01/2017     COLON CANCER SCREEN (SYSTEM ASSIGNED)  09/12/2017     TETANUS IMMUNIZATION (SYSTEM ASSIGNED)  07/08/2018     ADVANCE DIRECTIVE PLANNING Q5 YRS  02/19/2020     LIPID SCREEN Q5 YR MALE (SYSTEM ASSIGNED)  07/07/2021     PNEUMOCOCCAL  Completed     Patient Active Problem List    Diagnosis Date Noted     Thyroiditis 07/22/2015     Priority: Medium     He had episode thyroiditis with hyperthyroid in February, 2015 which seemed to resolve.        CHF (congestive heart failure) (H) 06/05/2015     Priority: Medium     Tachycardia 02/21/2015     Priority: Medium     ASCVD (arteriosclerotic cardiovascular disease) 02/20/2015     Priority: Medium     2/20/2016:Acute coronary syndrome:[facility] he had a cardiac cath, which he underwent on 2/20 and showed 50% LAD ostial lesion that will be treated medically.       Cataract, right eye 07/22/2013     Priority: Medium     Prediabetes 07/19/2012     Priority: Medium     HYPERLIPIDEMIA LDL GOAL <130 10/31/2010     Priority: Medium     BPH (benign prostatic hypertrophy) 09/15/2010     Priority: Medium     OA (osteoarthritis) 07/15/2010     Priority: Medium     little fingers       Delayed gastric emptying 05/17/2010     Priority: Medium     Adrenal adenoma 12/07/2009     Priority: Medium     CT 8/04/09:  There is a 1.9 cm benign adrenal adenoma on the right.         "Hypertension goal BP (blood pressure) < 140/90 2008     Priority: Medium     Lipoma of other specified sites 2006     Priority: Medium     right upper back-no changes for years       TINEA VERSICOLOR 2006     Priority: Medium     trunk       Advanced directives, counseling/discussion 2011     Priority: Low     Discussed advance care planning with patient; information given to patient to review. 2011          Health Care Home 2011     Priority: Low     hypertension treatment and monitoring  Annual exams.  DX V65.8 REPLACED WITH 95590 HEALTH CARE HOME (2013)          Family history:  CV disease: brother  Prostate cancer: no  Colon cancer: MGF    Multivitamin or Vit D use: MVI    Vaccines:current     Past Colon cancer screenin    ROS:  General: No change in weight, sleep or appetite.  Normal energy.  No fever or chills  Eyes: Negative for vision changes or eye problems  ENT: No problems with ears, nose or throat.  No difficulty swallowing.  Resp: No coughing, wheezing or shortness of breath  CV: No chest pains or palpitations  GI: No nausea, vomiting,  heartburn, abdominal pain, diarrhea, constipation or change in bowel habits  : POSITIVE for:, nocturia x 3-5  Musculoskeletal: No significant muscle or joint pains  Neurologic: No headaches, numbness, tingling, weakness, problems with balance or coordination  Psychiatric: No problems with anxiety, depression or mental health  Heme/immune/allergy: No history of bleeding or clotting problems or anemia.  No allergies or immune system problems  Endocrine: No history of thyroid disease, diabetes or other endocrine disorders  Skin: No rashes,worrisome lesions or skin problems    OBJECTIVE:                                                    OBJECTIVE:Blood pressure 130/70, pulse 60, resp. rate 16, height 5' 9\" (1.753 m), weight 175 lb (79.4 kg). BMI=Body mass index is 25.84 kg/(m^2).  GENERAL APPEARANCE ADULT: Alert, no acute " distress  EYES: PERRL, EOM normal, conjunctiva and lids normal  HENT: Ears and TMs normal, oral mucosa and posterior oropharynx normal  NECK: No adenopathy,masses or thyromegaly  RESP: lungs clear to auscultation   CV: normal rate, regular rhythm, no murmur or gallop  ABDOMEN: soft, no organomegaly, masses or tenderness  MS: extremities normal, no peripheral edema    ASSESSMENT/PLAN:                                                    Lifestyle recommendations:continue current healthy lifestyle efforts including regular exercise and keeping a good weight  The following exams/tests were recommended and discussed for health maintenance:  Colonoscopy test is done at the hospital and if normal, no other testing is needed for 10 years.   When to stop colon cancer screening?  Experts agree that colon cancer screening is generally not recommended when life expectancy is <10 years and not at age over 85.  The Expert group USPSTF recommends against screening for ages 76 and older.  It is reasonable to stop screening for colon cancer sometime between age 76 and 85 for most individuals.   Prostate cancer screening is not recommended for older men, those age 70 and older or with anticipated lifespan <10 years.  The expert group USPSTF rcommmends against any PSA prostate cancer screening.    (Z00.00) Medicare annual wellness visit, subsequent  (primary encounter diagnosis)    (E78.5) Hyperlipidemia LDL goal <130  Comment: due for recheck  Plan: Lipid panel reflex to direct LDL, atorvastatin         (LIPITOR) 40 MG tablet        Fasting blood tests today.     (I25.10) ASCVD (arteriosclerotic cardiovascular disease)  Comment: doing well  Plan: metoprolol (LOPRESSOR) 100 MG tablet        No change in current treatment plan.     (I10) Essential hypertension with goal blood pressure less than 140/90  Comment: doing well  Plan: metoprolol (LOPRESSOR) 100 MG tablet, losartan         (COZAAR) 100 MG tablet, doxazosin (CARDURA) 8          "MG tablet        No change in current treatment plan.  The Doxazosin is being increased for prostate symptoms.  This could lower blood pressure.     (N40.1) Benign non-nodular prostatic hyperplasia with lower urinary tract symptoms  Comment:   Plan: doxazosin (CARDURA) 8 MG tablet        Increase to 8mg daily to see if it helps with frequent urination at night.     (E06.9) Thyroiditis  Comment: check thyroid again today.   Plan: TSH          (I50.9) Congestive heart failure, unspecified congestive heart failure chronicity, unspecified congestive heart failure type (H)  Comment: stable  Plan: No change in current treatment plan.     (Z12.11) Special screening for malignant neoplasms, colon  Comment:   Plan: GASTROENTEROLOGY ADULT REF PROCEDURE ONLY        Schedule an appointment for colonoscopies and gastroscopies with surgery scheduling.  Call [phone] to schedule the procedures.           End of Life Planning:  Patient currently has an advanced directive: No.  I have verified the patient's ablity to prepare an advanced directive/make health care decisions.  Literature was provided to assist patient in preparing an advanced directive.    COUNSELING:  Reviewed preventive health counseling, as reflected in patient instructions  Special attention given to:       Colon cancer screening       Prostate cancer screening        Estimated body mass index is 25.84 kg/(m^2) as calculated from the following:    Height as of this encounter: 5' 9\" (1.753 m).    Weight as of this encounter: 175 lb (79.4 kg).     reports that he has never smoked. He has never used smokeless tobacco.      Appropriate preventive services were discussed with this patient, including applicable screening as appropriate for cardiovascular disease, diabetes, osteopenia/osteoporosis, and glaucoma.  As appropriate for age/gender, discussed screening for colorectal cancer, prostate cancer, breast cancer, and cervical cancer. Checklist reviewing " preventive services available has been given to the patient.    Reviewed patients plan of care and provided an AVS. The Basic Care Plan (routine screening as documented in Health Maintenance) for Dave meets the Care Plan requirement. This Care Plan has been established and reviewed with the Patient.    Counseling Resources:  ATP IV Guidelines  Pooled Cohorts Equation Calculator  Breast Cancer Risk Calculator  FRAX Risk Assessment  ICSI Preventive Guidelines  Dietary Guidelines for Americans, 2010  USDA's MyPlate  ASA Prophylaxis  Lung CA Screening    Raymon Huang MD  [facility]

## 2017-07-13 NOTE — PROGRESS NOTES
"Mk Acosta,   TSH is normal indicating that thyroid function is normal.   Lipid tests including total cholesterol, triglycerides, HDL (\"good cholesterol\") and LDL (\"bad cholesterol\") are normal.    Tests look good.     KERON ESCOBEDO MD"

## 2017-09-06 ENCOUNTER — TELEPHONE (OUTPATIENT)
Dept: FAMILY MEDICINE | Facility: CLINIC | Age: 77
End: 2017-09-06

## 2017-09-06 NOTE — TELEPHONE ENCOUNTER
Reason for Call:  Other     Detailed comments: Patient has a colonoscopy scheduled and needs to know if he should hold some meds before procedure    Phone Number Patient can be reached at: Home number on file [phone] (home)    Best Time:     Can we leave a detailed message on this number? YES    Call taken on 9/6/2017 at 1:41 PM by Mary Kate Gonzalez

## 2017-09-13 ENCOUNTER — ANESTHESIA EVENT (OUTPATIENT)
Dept: GASTROENTEROLOGY | Facility: CLINIC | Age: 77
End: 2017-09-13
Payer: MEDICARE

## 2017-09-13 ASSESSMENT — ENCOUNTER SYMPTOMS: DYSRHYTHMIAS: 1

## 2017-09-13 NOTE — ANESTHESIA PREPROCEDURE EVALUATION
Anesthesia Evaluation     . Pt has had prior anesthetic. Type: MAC and General    History of anesthetic complications   - PONV        ROS/MED HX    ENT/Pulmonary:       Neurologic:       Cardiovascular:     (+) hypertension--CAD, --. : . CHF . . :. dysrhythmias . Previous cardiac testing Echodate:6-2015results:Interpretation Summary     The left ventricular ejection fraction is normal.  No regional wall motion abnormalities noted.  Trace valvular insufficiency without stenosis.  No old study available for comparison.date: results:ECG reviewed date:2-2015 results:Sinus Rhythm   WITHIN NORMAL LIMITS   date: results:          METS/Exercise Tolerance:     Hematologic:         Musculoskeletal:   (+) arthritis, , , -       GI/Hepatic:         Renal/Genitourinary:     (+) BPH,       Endo:     (+) Other Endocrine Disorder adrenal adenoma, prediabetes.      Psychiatric:         Infectious Disease:         Malignancy:         Other:                     Physical Exam  Normal systems: cardiovascular, pulmonary and dental    Airway   Mallampati: II  TM distance: >3 FB  Neck ROM: full    Dental     Cardiovascular       Pulmonary                     Anesthesia Plan      History & Physical Review  History and physical reviewed and following examination; no interval change.    ASA Status:  3 .    NPO Status:  > 6 hours    Plan for MAC          Postoperative Care      Consents  Anesthetic plan, risks, benefits and alternatives discussed with:  Patient..                          .

## 2017-09-14 ENCOUNTER — ANESTHESIA (OUTPATIENT)
Dept: GASTROENTEROLOGY | Facility: CLINIC | Age: 77
End: 2017-09-14
Payer: MEDICARE

## 2017-09-14 ENCOUNTER — SURGERY (OUTPATIENT)
Age: 77
End: 2017-09-14

## 2017-09-14 ENCOUNTER — HOSPITAL ENCOUNTER (OUTPATIENT)
Facility: CLINIC | Age: 77
Discharge: HOME | End: 2017-09-14
Attending: SURGERY | Admitting: SURGERY
Payer: MEDICARE

## 2017-09-14 VITALS
BODY MASS INDEX: 25.92 KG/M2 | HEIGHT: 69 IN | OXYGEN SATURATION: 95 % | SYSTOLIC BLOOD PRESSURE: 123 MMHG | RESPIRATION RATE: 16 BRPM | WEIGHT: 175 LBS | DIASTOLIC BLOOD PRESSURE: 70 MMHG | HEART RATE: 57 BPM | TEMPERATURE: 97.8 F

## 2017-09-14 LAB — COLONOSCOPY: NORMAL

## 2017-09-14 PROCEDURE — G0121 COLON CA SCRN NOT HI RSK IND: HCPCS | Performed by: SURGERY

## 2017-09-14 PROCEDURE — 25000125 ZZHC RX 250: Performed by: SURGERY

## 2017-09-14 PROCEDURE — 25000125 ZZHC RX 250: Performed by: NURSE ANESTHETIST, CERTIFIED REGISTERED

## 2017-09-14 PROCEDURE — 25000128 H RX IP 250 OP 636: Performed by: NURSE ANESTHETIST, CERTIFIED REGISTERED

## 2017-09-14 PROCEDURE — 37000008 ZZH ANESTHESIA TECHNICAL FEE, 1ST 30 MIN: Performed by: SURGERY

## 2017-09-14 PROCEDURE — 45378 DIAGNOSTIC COLONOSCOPY: CPT | Performed by: SURGERY

## 2017-09-14 PROCEDURE — 25000128 H RX IP 250 OP 636: Performed by: SURGERY

## 2017-09-14 RX ORDER — SODIUM CHLORIDE, SODIUM LACTATE, POTASSIUM CHLORIDE, CALCIUM CHLORIDE 600; 310; 30; 20 MG/100ML; MG/100ML; MG/100ML; MG/100ML
INJECTION, SOLUTION INTRAVENOUS CONTINUOUS
Status: DISCONTINUED | OUTPATIENT
Start: 2017-09-14 | End: 2017-09-14 | Stop reason: HOSPADM

## 2017-09-14 RX ORDER — GLYCOPYRROLATE 0.2 MG/ML
INJECTION, SOLUTION INTRAMUSCULAR; INTRAVENOUS PRN
Status: DISCONTINUED | OUTPATIENT
Start: 2017-09-14 | End: 2017-09-14

## 2017-09-14 RX ORDER — PROPOFOL 10 MG/ML
INJECTION, EMULSION INTRAVENOUS CONTINUOUS PRN
Status: DISCONTINUED | OUTPATIENT
Start: 2017-09-14 | End: 2017-09-14

## 2017-09-14 RX ORDER — LIDOCAINE 40 MG/G
CREAM TOPICAL
Status: DISCONTINUED | OUTPATIENT
Start: 2017-09-14 | End: 2017-09-14 | Stop reason: HOSPADM

## 2017-09-14 RX ORDER — PROPOFOL 10 MG/ML
INJECTION, EMULSION INTRAVENOUS PRN
Status: DISCONTINUED | OUTPATIENT
Start: 2017-09-14 | End: 2017-09-14

## 2017-09-14 RX ORDER — ONDANSETRON 2 MG/ML
4 INJECTION INTRAMUSCULAR; INTRAVENOUS
Status: DISCONTINUED | OUTPATIENT
Start: 2017-09-14 | End: 2017-09-14 | Stop reason: HOSPADM

## 2017-09-14 RX ADMIN — SODIUM CHLORIDE, POTASSIUM CHLORIDE, SODIUM LACTATE AND CALCIUM CHLORIDE: 600; 310; 30; 20 INJECTION, SOLUTION INTRAVENOUS at 08:19

## 2017-09-14 RX ADMIN — GLYCOPYRROLATE 0.3 MG: 0.2 INJECTION, SOLUTION INTRAMUSCULAR; INTRAVENOUS at 09:02

## 2017-09-14 RX ADMIN — PROPOFOL 50 MG: 10 INJECTION, EMULSION INTRAVENOUS at 08:55

## 2017-09-14 RX ADMIN — PROPOFOL 200 MCG/KG/MIN: 10 INJECTION, EMULSION INTRAVENOUS at 08:56

## 2017-09-14 RX ADMIN — PROPOFOL 50 MG: 10 INJECTION, EMULSION INTRAVENOUS at 08:56

## 2017-09-14 RX ADMIN — LIDOCAINE HYDROCHLORIDE 1 ML: 10 INJECTION, SOLUTION EPIDURAL; INFILTRATION; INTRACAUDAL; PERINEURAL at 08:19

## 2017-09-14 NOTE — H&P
"77 year old year old male here for colonoscopy for screening.    Patient Active Problem List   Diagnosis     Lipoma of other specified sites     TINEA VERSICOLOR     Hypertension goal BP (blood pressure) < 140/90     Adrenal adenoma     Delayed gastric emptying     OA (osteoarthritis)     BPH (benign prostatic hypertrophy)     HYPERLIPIDEMIA LDL GOAL <130     Advanced directives, counseling/discussion     Health Care Home     Prediabetes     Cataract, right eye     ASCVD (arteriosclerotic cardiovascular disease)     Tachycardia     CHF (congestive heart failure) (H)     Thyroiditis       Past Medical History:   Diagnosis Date     Arthritis     Some in fingers     CHF (congestive heart failure) (H) 6/5/2015     PONV (postoperative nausea and vomiting)        Past Surgical History:   Procedure Laterality Date     BIOPSY  1983    Hair loss (pseudopelade)     BIOPSY  1985    Pseudopelade (Hair loss)     COLONOSCOPY  September 2007    repeat 5 years     CORONARY ANGIOGRAPHY ADULT ORDER  2/20/15     ENDOSCOPY  09/2009    Upper GI     EYE SURGERY  2013    Removal of cataracts, both eyes     EYE SURGERY  10/2013    Removal of cataracts, both eyes     HERNIA REPAIR, INGUINAL RT/LT  05/24/2010    left side     HERNIORRHAPHY INGUINAL  12/20/2010    HERNIORRHAPHY INGUINAL performed by BIBI PARKER at [facility]     PAST SURGICAL HISTORY      [place] teeth     SURGICAL HISTORY OF -   2002    Colonoscopy-Repeat in 5 years-polyp       @[facility]@    No current outpatient prescriptions on file.       Allergies   Allergen Reactions     Amlodipine Swelling     Quinazolines      Minipress-heart racing         Pt reports that he has never smoked. He has never used smokeless tobacco. He reports that he drinks alcohol. He reports that he does not use illicit drugs.    Exam:  /65  Pulse 57  Temp 97.8  F (36.6  C)  Resp 16  Ht 1.753 m (5' 9\")  Wt 79.4 kg (175 lb)  SpO2 99%  BMI 25.84 kg/m2    Awake, Alert OX3  Lungs - CTA " bilaterally  CV - RRR, no murmurs, distal pulses intact  Abd - soft, non-distended, non-tender, +BS  Extr - No cyanosis or edema    A/P 77 year old year old male in need of colonoscopy for screening. Risks, benefits, alternatives, and complications were discussed including the possibility of perforation and the patient agreed to proceed    Raudel Avitia MD

## 2017-09-14 NOTE — ANESTHESIA CARE TRANSFER NOTE
Patient: Dave Spann    Procedure(s):  Colonoscopy   - Wound Class: II-Clean Contaminated    Diagnosis: screening  Diagnosis Additional Information: No value filed.    Anesthesia Type:   MAC     Note:    Patient transferred to:Phase II        Vitals: (Last set prior to Anesthesia Care Transfer)    CRNA VITALS  9/14/2017 0839 - 9/14/2017 0909      9/14/2017             Pulse: 55    Ht Rate: 55    SpO2: 99 %                Electronically Signed By: Jane Hernandez CRNA, APRN CRNA  September 14, 2017  9:09 AM

## 2017-09-14 NOTE — ANESTHESIA POSTPROCEDURE EVALUATION
Patient: Dave Spann    Procedure(s):  Colonoscopy   - Wound Class: II-Clean Contaminated    Diagnosis:screening  Diagnosis Additional Information: No value filed.    Anesthesia Type:  MAC    Note:  Anesthesia Post Evaluation    Patient location during evaluation: Bedside  Patient participation: Able to fully participate in evaluation  Level of consciousness: awake and alert  Pain management: adequate  Airway patency: patent  Cardiovascular status: acceptable  Respiratory status: acceptable  Hydration status: acceptable  PONV: none     Anesthetic complications: None          Last vitals:  Vitals:    09/14/17 0757   BP: 119/65   Pulse: 57   Resp: 16   Temp: 36.6  C (97.8  F)   SpO2: 99%         Electronically Signed By: Jane Hernandez CRNA, APRN CRNA  September 14, 2017  9:09 AM

## 2017-10-01 DIAGNOSIS — N40.1 BENIGN NON-NODULAR PROSTATIC HYPERPLASIA WITH LOWER URINARY TRACT SYMPTOMS: ICD-10-CM

## 2017-10-01 DIAGNOSIS — I10 ESSENTIAL HYPERTENSION WITH GOAL BLOOD PRESSURE LESS THAN 140/90: ICD-10-CM

## 2017-10-01 NOTE — TELEPHONE ENCOUNTER
doxazosin (CARDURA) 8 MG tablet         Last Written Prescription Date: 7/12/17  Last Fill Quantity: 90, # refills: 3    Last Office Visit with GABI, P or [facility] prescribing provider:  7/12/17   Future Office Visit:      BP Readings from Last 3 Encounters:   09/14/17 123/70   07/12/17 130/70   06/05/17 152/76     Rosa KENT)

## 2017-10-02 RX ORDER — DOXAZOSIN 4 MG/1
TABLET ORAL
Qty: 90 TABLET | Refills: 3 | Status: SHIPPED | OUTPATIENT
Start: 2017-10-02 | End: 2018-07-16 | Stop reason: DRUGHIGH

## 2018-05-30 DIAGNOSIS — I25.10 CORONARY ARTERY DISEASE INVOLVING NATIVE HEART WITHOUT ANGINA PECTORIS, UNSPECIFIED VESSEL OR LESION TYPE: ICD-10-CM

## 2018-05-30 RX ORDER — HYDROCHLOROTHIAZIDE 12.5 MG/1
12.5 TABLET ORAL DAILY
Qty: 90 TABLET | Refills: 0 | Status: SHIPPED | OUTPATIENT
Start: 2018-05-30 | End: 2018-07-26

## 2018-07-13 ASSESSMENT — ACTIVITIES OF DAILY LIVING (ADL)
CURRENT_FUNCTION: NO ASSISTANCE NEEDED
I_NEED_ASSISTANCE_FOR_THE_FOLLOWING_DAILY_ACTIVITIES:: NO ASSISTANCE IS NEEDED

## 2018-07-16 ENCOUNTER — OFFICE VISIT (OUTPATIENT)
Dept: FAMILY MEDICINE | Facility: CLINIC | Age: 78
End: 2018-07-16
Payer: COMMERCIAL

## 2018-07-16 VITALS
HEART RATE: 72 BPM | RESPIRATION RATE: 16 BRPM | WEIGHT: 179.8 LBS | SYSTOLIC BLOOD PRESSURE: 136 MMHG | HEIGHT: 69 IN | TEMPERATURE: 97.7 F | DIASTOLIC BLOOD PRESSURE: 70 MMHG | BODY MASS INDEX: 26.63 KG/M2

## 2018-07-16 DIAGNOSIS — I10 ESSENTIAL HYPERTENSION WITH GOAL BLOOD PRESSURE LESS THAN 140/90: ICD-10-CM

## 2018-07-16 DIAGNOSIS — N40.1 BENIGN NON-NODULAR PROSTATIC HYPERPLASIA WITH LOWER URINARY TRACT SYMPTOMS: ICD-10-CM

## 2018-07-16 DIAGNOSIS — Z00.00 MEDICARE ANNUAL WELLNESS VISIT, SUBSEQUENT: Primary | ICD-10-CM

## 2018-07-16 DIAGNOSIS — E78.5 HYPERLIPIDEMIA LDL GOAL <130: ICD-10-CM

## 2018-07-16 DIAGNOSIS — I25.10 CORONARY ARTERY DISEASE INVOLVING NATIVE HEART WITHOUT ANGINA PECTORIS, UNSPECIFIED VESSEL OR LESION TYPE: ICD-10-CM

## 2018-07-16 DIAGNOSIS — E06.9 THYROIDITIS: ICD-10-CM

## 2018-07-16 DIAGNOSIS — I50.9 CONGESTIVE HEART FAILURE, UNSPECIFIED CONGESTIVE HEART FAILURE CHRONICITY, UNSPECIFIED CONGESTIVE HEART FAILURE TYPE: ICD-10-CM

## 2018-07-16 LAB
ANION GAP SERPL CALCULATED.3IONS-SCNC: 6 MMOL/L (ref 3–14)
BUN SERPL-MCNC: 23 MG/DL (ref 7–30)
CALCIUM SERPL-MCNC: 9.4 MG/DL (ref 8.5–10.1)
CHLORIDE SERPL-SCNC: 104 MMOL/L (ref 94–109)
CHOLEST SERPL-MCNC: 114 MG/DL
CO2 SERPL-SCNC: 26 MMOL/L (ref 20–32)
CREAT SERPL-MCNC: 0.93 MG/DL (ref 0.66–1.25)
ERYTHROCYTE [DISTWIDTH] IN BLOOD BY AUTOMATED COUNT: 12.7 % (ref 10–15)
GFR SERPL CREATININE-BSD FRML MDRD: 79 ML/MIN/1.7M2
GLUCOSE SERPL-MCNC: 108 MG/DL (ref 70–99)
HCT VFR BLD AUTO: 46.7 % (ref 40–53)
HDLC SERPL-MCNC: 46 MG/DL
HGB BLD-MCNC: 15.8 G/DL (ref 13.3–17.7)
LDLC SERPL CALC-MCNC: 52 MG/DL
MCH RBC QN AUTO: 30.2 PG (ref 26.5–33)
MCHC RBC AUTO-ENTMCNC: 33.8 G/DL (ref 31.5–36.5)
MCV RBC AUTO: 89 FL (ref 78–100)
NONHDLC SERPL-MCNC: 68 MG/DL
PLATELET # BLD AUTO: 218 10E9/L (ref 150–450)
POTASSIUM SERPL-SCNC: 4.2 MMOL/L (ref 3.4–5.3)
RBC # BLD AUTO: 5.23 10E12/L (ref 4.4–5.9)
SODIUM SERPL-SCNC: 136 MMOL/L (ref 133–144)
TRIGL SERPL-MCNC: 79 MG/DL
TSH SERPL DL<=0.005 MIU/L-ACNC: 1.24 MU/L (ref 0.4–4)
WBC # BLD AUTO: 9.3 10E9/L (ref 4–11)

## 2018-07-16 PROCEDURE — 99397 PER PM REEVAL EST PAT 65+ YR: CPT | Performed by: FAMILY MEDICINE

## 2018-07-16 PROCEDURE — 80061 LIPID PANEL: CPT | Performed by: FAMILY MEDICINE

## 2018-07-16 PROCEDURE — 85027 COMPLETE CBC AUTOMATED: CPT | Performed by: FAMILY MEDICINE

## 2018-07-16 PROCEDURE — 36415 COLL VENOUS BLD VENIPUNCTURE: CPT | Performed by: FAMILY MEDICINE

## 2018-07-16 PROCEDURE — 80048 BASIC METABOLIC PNL TOTAL CA: CPT | Performed by: FAMILY MEDICINE

## 2018-07-16 PROCEDURE — 84443 ASSAY THYROID STIM HORMONE: CPT | Performed by: FAMILY MEDICINE

## 2018-07-16 RX ORDER — METOPROLOL TARTRATE 100 MG
100 TABLET ORAL 2 TIMES DAILY
Qty: 180 TABLET | Refills: 3 | Status: SHIPPED | OUTPATIENT
Start: 2018-07-16 | End: 2019-07-19

## 2018-07-16 RX ORDER — ATORVASTATIN CALCIUM 40 MG/1
TABLET, FILM COATED ORAL
Qty: 90 TABLET | Refills: 3 | Status: SHIPPED | OUTPATIENT
Start: 2018-07-16 | End: 2019-07-19

## 2018-07-16 RX ORDER — DOXAZOSIN 8 MG/1
8 TABLET ORAL AT BEDTIME
Qty: 90 TABLET | Refills: 3 | Status: SHIPPED | OUTPATIENT
Start: 2018-07-16 | End: 2019-07-16

## 2018-07-16 RX ORDER — LOSARTAN POTASSIUM 100 MG/1
100 TABLET ORAL DAILY
Qty: 90 TABLET | Refills: 3 | Status: SHIPPED | OUTPATIENT
Start: 2018-07-16 | End: 2019-07-19

## 2018-07-16 ASSESSMENT — PAIN SCALES - GENERAL: PAINLEVEL: NO PAIN (0)

## 2018-07-16 NOTE — MR AVS SNAPSHOT
After Visit Summary   7/16/2018    Dave Spann    MRN: [id number]           Patient Information     Date Of Birth          1940        Visit Information        Provider Department      7/16/2018 8:40 AM Raymon Huang MD [facility]        Today's Diagnoses     Medicare annual wellness visit, subsequent    -  1    Hyperlipidemia LDL goal <130        Essential hypertension with goal blood pressure less than 140/90        Benign non-nodular prostatic hyperplasia with lower urinary tract symptoms        Coronary artery disease involving native heart without angina pectoris, unspecified vessel or lesion type        Congestive heart failure, unspecified congestive heart failure chronicity, unspecified congestive heart failure type (H)        Thyroiditis          Care Instructions      Preventive Health Recommendations:       Male Ages 65 and over    Yearly exam:             See your health care provider every year in order to  o   Review health changes.   o   Discuss preventive care.    o   Review your medicines if your doctor has prescribed any.    Talk with your health care provider about whether you should have a test to screen for prostate cancer (PSA).    Every 3 years, have a diabetes test (fasting glucose). If you are at risk for diabetes, you should have this test more often.    Every 5 years, have a cholesterol test. Have this test more often if you are at risk for high cholesterol or heart disease.     Every 10 years, have a colonoscopy. Or, have a yearly FIT test (stool test). These exams will check for colon cancer.    Talk to with your health care provider about screening for Abdominal Aortic Aneurysm if you have a family history of AAA or have a history of smoking.  Shots:     Get a flu shot each year.     Get a tetanus shot every 10 years.     Talk to your doctor about your pneumonia vaccines. There are now two you should receive - Pneumovax (PPSV 23) and  Prevnar (PCV 13).    Talk to your pharmacist about a shingles vaccine.     Talk to your doctor about the hepatitis B vaccine.  Nutrition:     Eat at least 5 servings of fruits and vegetables each day.     Eat whole-grain bread, whole-wheat pasta and brown rice instead of white grains and rice.     Get adequate Calcium and Vitamin D.   Lifestyle    Exercise for at least 150 minutes a week (30 minutes a day, 5 days a week). This will help you control your weight and prevent disease.     Limit alcohol to one drink per day.     No smoking.     Wear sunscreen to prevent skin cancer.     See your dentist every six months for an exam and cleaning.     See your eye doctor every 1 to 2 years to screen for conditions such as glaucoma, macular degeneration and cataracts.    ASSESSMENT/PLAN:                                                    Lifestyle recommendations:continue current healthy lifestyle efforts including regular exercise and keeping a good weight  The following exams/tests were recommended and discussed for health maintenance:  Colon cancer screening recommended 2027  When to stop colon cancer screening?  Experts agree that colon cancer screening is generally not recommended when life expectancy is <10 years and not at age over 85.  The Expert group USPSTF recommends against screening for ages 76 and older.  It is reasonable to stop screening for colon cancer sometime between age 76 and 85 for most individuals.   Prostate cancer screening is not recommended for older men, those age 70 and older or with anticipated lifespan <10 years.  The expert group USPSTF rcommmends against any PSA prostate cancer screening.      (Z00.00) Medicare annual wellness visit, subsequent  (primary encounter diagnosis)  Comment:   Plan: Fasting blood tests today.     (E78.5) Hyperlipidemia LDL goal <130  Comment: due for recheck  Plan: atorvastatin (LIPITOR) 40 MG tablet, Lipid         panel reflex to direct LDL Fasting          (I10)  Essential hypertension with goal blood pressure less than 140/90  Comment: doingwell  Plan: doxazosin (CARDURA) 8 MG tablet, losartan         (COZAAR) 100 MG tablet, metoprolol tartrate         (LOPRESSOR) 100 MG tablet, **Basic metabolic         panel FUTURE 1yr, CBC with platelets        No change in current treatment plan.   Refills.     (N40.1) Benign non-nodular prostatic hyperplasia with lower urinary tract symptoms  Comment:   Plan: doxazosin (CARDURA) 8 MG tablet        REfills.     (I25.10) Coronary artery disease involving native heart without angina pectoris, unspecified vessel or lesion type  Comment: has been stable  Plan: No change in current treatment plan.    follow-up echocardiogram and with cardiology as planned.     (I50.9) Congestive heart failure, unspecified congestive heart failure chronicity, unspecified congestive heart failure type (H)  Comment: stable.  Plan: No change in current treatment plan.     (E06.9) Thyroiditis  Comment: past  Plan: TSH    Check with pharmacy about tetanus vaccine.           Follow-ups after your visit        Your next 10 appointments already scheduled     Jul 18, 2018  9:45 AM CDT   Ech Complete with CYNTHIA   [facility] ([facility])    [address] [address]   [phone]           1.  Please bring or wear a comfortable two-piece outfit. 2.  You may eat, drink and take your normal medicines. 3.  For any questions that cannot be answered, please contact the ordering physician 4.  Please do not wear perfumes or scented lotions on the day of your exam.            Jul 26, 2018  2:00 PM CDT   Return Visit with Vijaya Hernandez MD   [facility] ([facility])    [address] [address]   [phone]              Who to contact     If you have questions or need follow up information about today's clinic visit or your schedule please  "contact [facility] directly at [phone].  Normal or non-critical lab and imaging results will be communicated to you by MyChart, letter or phone within 4 business days after the clinic has received the results. If you do not hear from us within 7 days, please contact the clinic through [employer] or phone. If you have a critical or abnormal lab result, we will notify you by phone as soon as possible.  Submit refill requests through [employer] or call your pharmacy and they will forward the refill request to us. Please allow 3 business days for your refill to be completed.          Additional Information About Your Visit        [employer] Information     [employer] gives you secure access to your electronic health record. If you see a primary care provider, you can also send messages to your care team and make appointments. If you have questions, please call your primary care clinic.  If you do not have a primary care provider, please call [phone] and they will assist you.        Care EveryWhere ID     This is your Care EveryWhere ID. This could be used by other organizations to access your [place] medical records  [id number]        Your Vitals Were     Pulse Temperature Respirations Height BMI (Body Mass Index)       72 97.7  F (36.5  C) (Tympanic) 16 5' 8.75\" (1.746 m) 26.75 kg/m2        Blood Pressure from Last 3 Encounters:   07/16/18 136/70   09/14/17 123/70   07/12/17 130/70    Weight from Last 3 Encounters:   07/16/18 179 lb 12.8 oz (81.6 kg)   09/14/17 175 lb (79.4 kg)   07/12/17 175 lb (79.4 kg)              We Performed the Following     **Basic metabolic panel FUTURE 1yr     CBC with platelets     Lipid panel reflex to direct LDL Fasting     TSH          Today's Medication Changes          These changes are accurate as of 7/16/18  9:42 AM.  If you have any questions, ask your nurse or doctor.               These medicines have changed or have updated prescriptions.        Dose/Directions "    doxazosin 8 MG tablet   Commonly known as:  CARDURA   This may have changed:  Another medication with the same name was removed. Continue taking this medication, and follow the directions you see here.   Used for:  Essential hypertension with goal blood pressure less than 140/90, Benign non-nodular prostatic hyperplasia with lower urinary tract symptoms   Changed by:  Raymon Huang MD        Dose:  8 mg   Take 1 tablet (8 mg) by mouth At Bedtime   Quantity:  90 tablet   Refills:  3            Where to get your medicines      These medications were sent to [facility] [address] [address]     Phone:  [phone]     atorvastatin 40 MG tablet    doxazosin 8 MG tablet    losartan 100 MG tablet    metoprolol tartrate 100 MG tablet                Primary Care Provider Office Phone # Fax #    Raymon Huang [phone] [address] [address]        Equal Access to Services     [facility]: Hadii aad ku hadasho Soomaali, waaxda luqadaha, qaybta kaalmada adeegyada, waxay idiin haytiffanien ham das . So [facility] [phone].    ATENCIÓN: Si habla español, tiene a mancuso disposición servicios gratuitos de asistencia lingüística. [facility] [phone].    We comply with applicable federal civil rights laws and [place] laws. We do not discriminate on the basis of race, color, national origin, age, disability, sex, sexual orientation, or gender identity.            Thank you!     Thank you for choosing [facility]  for your care. Our goal is always to provide you with excellent care. Hearing back from our patients is one way we can continue to improve our services. Please take a few minutes to complete the written survey that you may receive in the mail after your visit with us. Thank you!             Your Updated Medication List - Protect others around you: Learn how to safely use, store and  throw away your medicines at www.disposemymeds.org.          This list is accurate as of 7/16/18  9:42 AM.  Always use your most recent med list.                   Brand Name Dispense Instructions for use Diagnosis    aspirin 81 MG tablet      1 TABLET DAILY        atorvastatin 40 MG tablet    LIPITOR    90 tablet    TAKE ONE TABLET BY MOUTH AT BEDTIME    Hyperlipidemia LDL goal <130       CALCIUM 500 500-250-200 MG-MG-UNIT Tabs   Generic drug:  Calcium-Magnesium-Vitamin D      Take 1 tablet by mouth 2 times daily        doxazosin 8 MG tablet    CARDURA    90 tablet    Take 1 tablet (8 mg) by mouth At Bedtime    Essential hypertension with goal blood pressure less than 140/90, Benign non-nodular prostatic hyperplasia with lower urinary tract symptoms       flaxseed oil 1000 MG Caps      1 DAILY        hydrochlorothiazide 12.5 MG Tabs tablet     90 tablet    Take 1 tablet (12.5 mg) by mouth daily    Coronary artery disease involving native heart without angina pectoris, unspecified vessel or lesion type       losartan 100 MG tablet    COZAAR    90 tablet    Take 1 tablet (100 mg) by mouth daily    Essential hypertension with goal blood pressure less than 140/90       metoprolol tartrate 100 MG tablet    LOPRESSOR    180 tablet    Take 1 tablet (100 mg) by mouth 2 times daily    Essential hypertension with goal blood pressure less than 140/90       MULTI VITAMIN MENS PO      Take 1 tablet by mouth daily.

## 2018-07-16 NOTE — PROGRESS NOTES
"Hi Dave,  Lipid tests including total cholesterol, triglycerides, HDL (\"good cholesterol\") and LDL (\"bad cholesterol\") are normal.    The blood chemistries (Basic metabolic panel) are all normal including electrolytes (salt balances in the blood) and kidney tests with the exception of glucose which is mildly increased in pre-diabetes range.   It is similar to past readings.   PLAN: No new changes in treatment recommended.   "

## 2018-07-16 NOTE — NURSING NOTE
"Chief Complaint   Patient presents with     Wellness Visit     Medication Reconciliation     CArdura in  twice- 4 mg and 8 mg.       Initial /74 (BP Location: Right arm, Patient Position: Chair, Cuff Size: Adult Large)  Pulse 72  Temp 97.7  F (36.5  C) (Tympanic)  Resp 16  Ht 5' 8.75\" (1.746 m)  Wt 179 lb 12.8 oz (81.6 kg)  BMI 26.75 kg/m2 Estimated body mass index is 26.75 kg/(m^2) as calculated from the following:    Height as of this encounter: 5' 8.75\" (1.746 m).    Weight as of this encounter: 179 lb 12.8 oz (81.6 kg).      Health Maintenance that is potentially due pending provider review:  BP was high, used pink card, recheck manually and labs    Possibly completing today per provider review.    Is there anyone who you would like to be able to receive your results? No  If yes have patient fill out PAULETTE  Anny Keith CMA    "

## 2018-07-16 NOTE — PATIENT INSTRUCTIONS
Preventive Health Recommendations:       Male Ages 65 and over    Yearly exam:             See your health care provider every year in order to  o   Review health changes.   o   Discuss preventive care.    o   Review your medicines if your doctor has prescribed any.    Talk with your health care provider about whether you should have a test to screen for prostate cancer (PSA).    Every 3 years, have a diabetes test (fasting glucose). If you are at risk for diabetes, you should have this test more often.    Every 5 years, have a cholesterol test. Have this test more often if you are at risk for high cholesterol or heart disease.     Every 10 years, have a colonoscopy. Or, have a yearly FIT test (stool test). These exams will check for colon cancer.    Talk to with your health care provider about screening for Abdominal Aortic Aneurysm if you have a family history of AAA or have a history of smoking.  Shots:     Get a flu shot each year.     Get a tetanus shot every 10 years.     Talk to your doctor about your pneumonia vaccines. There are now two you should receive - Pneumovax (PPSV 23) and Prevnar (PCV 13).    Talk to your pharmacist about a shingles vaccine.     Talk to your doctor about the hepatitis B vaccine.  Nutrition:     Eat at least 5 servings of fruits and vegetables each day.     Eat whole-grain bread, whole-wheat pasta and brown rice instead of white grains and rice.     Get adequate Calcium and Vitamin D.   Lifestyle    Exercise for at least 150 minutes a week (30 minutes a day, 5 days a week). This will help you control your weight and prevent disease.     Limit alcohol to one drink per day.     No smoking.     Wear sunscreen to prevent skin cancer.     See your dentist every six months for an exam and cleaning.     See your eye doctor every 1 to 2 years to screen for conditions such as glaucoma, macular degeneration and cataracts.    ASSESSMENT/PLAN:                                                     Lifestyle recommendations:continue current healthy lifestyle efforts including regular exercise and keeping a good weight  The following exams/tests were recommended and discussed for health maintenance:  Colon cancer screening recommended 2027  When to stop colon cancer screening?  Experts agree that colon cancer screening is generally not recommended when life expectancy is <10 years and not at age over 85.  The Expert group USPSTF recommends against screening for ages 76 and older.  It is reasonable to stop screening for colon cancer sometime between age 76 and 85 for most individuals.   Prostate cancer screening is not recommended for older men, those age 70 and older or with anticipated lifespan <10 years.  The expert group USPSTF rcommmends against any PSA prostate cancer screening.      (Z00.00) Medicare annual wellness visit, subsequent  (primary encounter diagnosis)  Comment:   Plan: Fasting blood tests today.     (E78.5) Hyperlipidemia LDL goal <130  Comment: due for recheck  Plan: atorvastatin (LIPITOR) 40 MG tablet, Lipid         panel reflex to direct LDL Fasting          (I10) Essential hypertension with goal blood pressure less than 140/90  Comment: doingwell  Plan: doxazosin (CARDURA) 8 MG tablet, losartan         (COZAAR) 100 MG tablet, metoprolol tartrate         (LOPRESSOR) 100 MG tablet, **Basic metabolic         panel FUTURE 1yr, CBC with platelets        No change in current treatment plan.   Refills.     (N40.1) Benign non-nodular prostatic hyperplasia with lower urinary tract symptoms  Comment:   Plan: doxazosin (CARDURA) 8 MG tablet        REfills.     (I25.10) Coronary artery disease involving native heart without angina pectoris, unspecified vessel or lesion type  Comment: has been stable  Plan: No change in current treatment plan.    follow-up echocardiogram and with cardiology as planned.     (I50.9) Congestive heart failure, unspecified congestive heart failure chronicity, unspecified  congestive heart failure type (H)  Comment: stable.  Plan: No change in current treatment plan.     (E06.9) Thyroiditis  Comment: past  Plan: TSH    Check with pharmacy about tetanus vaccine.

## 2018-07-16 NOTE — PROGRESS NOTES
SUBJECTIVE:   Dave Spann is a 77 year old male who presents for Preventive Visit.  Chief Complaint   Patient presents with     Wellness Visit     Medication Reconciliation     Cardura in  twice- 4 mg and 8 mg. On visit 07/      Last clinic visit: 7/12/2017 for wellness exam.   He has and echocardiogram scheduled for this week.   No cardiovascular symptoms.     Are you in the first 12 months of your Medicare Part B coverage?  No    Healthy Habits:  Answers for HPI/ROS submitted by the patient on 7/13/2018   Annual Exam:  Getting at least 3 servings of Calcium per day:: Yes  Bi-annual eye exam:: Yes  Dental care twice a year:: Yes  Sleep apnea or symptoms of sleep apnea:: None  Diet:: Low salt, Low fat/cholesterol  Frequency of exercise:: None.  Some yard work.  Gardening.   Taking medications regularly:: Yes  Medication side effects:: None  Additional concerns today:: No  Activities of Daily Living: no assistance needed  Home safety: lack of grab bars in the bathroom  Hearing Impairment:: difficult to understand a speaker at a public meeting or L[employer] service, difficulty understanding soft or whispered speech  PHQ-2 Score: 0    Fallen 2 or more times in the past year?: No  Any fall with injury in the past year?: No    COGNITIVE SCREEN  1) Repeat 3 items (Leader, Season, Table)    2) Clock draw: NORMAL  3) 3 item recall: Recalls 3 objects  Results: 3 items recalled: COGNITIVE IMPAIRMENT LESS LIKELY    Mini-CogTM Copyright S Lokesh. Licensed by the author for use in [facility]; reprinted with permission (tomasz@.[facility]). All rights reserved.      Social History   Substance Use Topics     Smoking status: Never Smoker     Smokeless tobacco: Never Used     Alcohol use Yes      Comment: One beer per year; sacramental wine     If you drink alcohol do you typically have >3 drinks per day or >7 drinks per week? No                        Today's PHQ-2 Score:   PHQ-2 ( 1999 [employer]) 7/13/2018  7/9/2017   Q1: Little interest or pleasure in doing things 0 0   Q2: Feeling down, depressed or hopeless 0 1   PHQ-2 Score 0 1   Q1: Little interest or pleasure in doing things Not at all Not at all   Q2: Feeling down, depressed or hopeless Not at all Several days   PHQ-2 Score 0 1     Do you feel safe in your environment - Yes    Do you have a Health Care Directive?: No: Advance care planning reviewed with patient; information given to patient to review.    Current providers sharing in care for this patient include:   Patient Care Team:  Raymon Huang MD as PCP - General (Family Practice)   Cardiology.     The following health maintenance items are reviewed in Epic and correct as of today:  Health Maintenance   Topic Date Due     HF ACTION PLAN Q3 YR  1940     AORTIC ANEURYSM SCREENING (SYSTEM ASSIGNED)  07/29/2007     ALT Q1 YR  12/15/2011     CBC Q1 YR  02/20/2016     BMP Q6 MOS  12/13/2017     TETANUS IMMUNIZATION (SYSTEM ASSIGNED)  07/08/2018     LIPID MONITORING Q1 YEAR  07/12/2018     FALL RISK ASSESSMENT  07/12/2018     INFLUENZA VACCINE (1) 09/01/2018     PHQ-2 Q1 YR  07/16/2019     ADVANCE DIRECTIVE PLANNING Q5 YRS  02/19/2020     COLON CANCER SCREEN (SYSTEM ASSIGNED)  09/14/2027     PNEUMOCOCCAL  Completed     Patient Active Problem List    Diagnosis Date Noted     Thyroiditis 07/22/2015     Priority: Medium     He had episode thyroiditis with hyperthyroid in February, 2015 which seemed to resolve.        CHF (congestive heart failure) (H) 06/05/2015     Priority: Medium     Tachycardia 02/21/2015     Priority: Medium     ASCVD (arteriosclerotic cardiovascular disease) 02/20/2015     Priority: Medium     2/20/2016:Acute coronary syndrome:[facility] he had a cardiac cath, which he underwent on 2/20 and showed 50% LAD ostial lesion that will be treated medically.       Cataract, right eye 07/22/2013     Priority: Medium     Prediabetes 07/19/2012     Priority: Medium     HYPERLIPIDEMIA LDL GOAL  <130 10/31/2010     Priority: Medium     BPH (benign prostatic hypertrophy) 09/15/2010     Priority: Medium     OA (osteoarthritis) 07/15/2010     Priority: Medium     little fingers       Delayed gastric emptying 05/17/2010     Priority: Medium     Adrenal adenoma 12/07/2009     Priority: Medium     CT 8/04/09:  There is a 1.9 cm benign adrenal adenoma on the right.        Hypertension goal BP (blood pressure) < 140/90 07/08/2008     Priority: Medium     Lipoma of other specified sites 07/27/2006     Priority: Medium     right upper back-no changes for years       TINEA VERSICOLOR 07/27/2006     Priority: Medium     trunk       Advanced directives, counseling/discussion 07/19/2011     Priority: Low     Discussed advance care planning with patient; information given to patient to review. 7/19/2011          Health Care Home 07/19/2011     Priority: Low     hypertension treatment and monitoring  Annual exams.  DX V65.8 REPLACED WITH 42531 HEALTH CARE HOME (04/08/2013)          Family history:  CV disease: brother  Prostate cancer: no  Colon cancer: MGF    Multivitamin or Vit D use: MVI    Vaccines:due for tetanus     Past Colon cancer screening:September, 2017    ROS:  General: No change in weight, sleep or appetite.  Normal energy.  No fever or chills  Eyes: Negative for vision changes or eye problems  ENT: No problems with ears, nose or throat.  No difficulty swallowing.  Resp: No coughing, wheezing or shortness of breath  CV: No chest pains or palpitations  GI: No nausea, vomiting,  heartburn, abdominal pain, diarrhea, constipation or change in bowel habits  : POSITIVE for:, urinary frequency, nocturia x 3-6.  Stream OK, no hesitancy.   Musculoskeletal: No significant muscle or joint pains  Neurologic: No headaches, numbness, tingling, weakness, problems with balance or coordination  Psychiatric: No problems with anxiety, depression or mental health  Heme/immune/allergy: No history of bleeding or clotting  "problems or anemia.  No allergies or immune system problems  Endocrine: No history of thyroid disease, diabetes or other endocrine disorders  Skin: No rashes,worrisome lesions or skin problems    OBJECTIVE:                                                    OBJECTIVE:Blood pressure 136/70, pulse 72, temperature 97.7  F (36.5  C), temperature source Tympanic, resp. rate 16, height 5' 8.75\" (1.746 m), weight 179 lb 12.8 oz (81.6 kg). BMI=Body mass index is 26.75 kg/(m^2).  GENERAL APPEARANCE ADULT: Alert, no acute distress  EYES: PERRL, EOM normal, conjunctiva and lids normal  HENT: Ears and TMs normal, oral mucosa and posterior oropharynx normal  NECK: No adenopathy,masses or thyromegaly  RESP: lungs clear to auscultation   CV: normal rate, regular rhythm, no murmur or gallop  ABDOMEN: soft, no organomegaly, masses or tenderness  MS: extremities normal, no peripheral edema    ASSESSMENT/PLAN:                                                    Lifestyle recommendations:continue current healthy lifestyle efforts including regular exercise and keeping a good weight  The following exams/tests were recommended and discussed for health maintenance:  Colon cancer screening recommended 2027  When to stop colon cancer screening?  Experts agree that colon cancer screening is generally not recommended when life expectancy is <10 years and not at age over 85.  The Expert group USPSTF recommends against screening for ages 76 and older.  It is reasonable to stop screening for colon cancer sometime between age 76 and 85 for most individuals.   Prostate cancer screening is not recommended for older men, those age 70 and older or with anticipated lifespan <10 years.  The expert group USPSTF rcommmends against any PSA prostate cancer screening.      (Z00.00) Medicare annual wellness visit, subsequent  (primary encounter diagnosis)  Comment:   Plan: Fasting blood tests today.     (E78.5) Hyperlipidemia LDL goal <130  Comment: due for " "recheck  Plan: atorvastatin (LIPITOR) 40 MG tablet, Lipid         panel reflex to direct LDL Fasting          (I10) Essential hypertension with goal blood pressure less than 140/90  Comment: doingwell  Plan: doxazosin (CARDURA) 8 MG tablet, losartan         (COZAAR) 100 MG tablet, metoprolol tartrate         (LOPRESSOR) 100 MG tablet, **Basic metabolic         panel FUTURE 1yr, CBC with platelets        No change in current treatment plan.   Refills.     (N40.1) Benign non-nodular prostatic hyperplasia with lower urinary tract symptoms  Comment:   Plan: doxazosin (CARDURA) 8 MG tablet        REfills.     (I25.10) Coronary artery disease involving native heart without angina pectoris, unspecified vessel or lesion type  Comment: has been stable  Plan: No change in current treatment plan.    follow-up echocardiogram and with cardiology as planned.     (I50.9) Congestive heart failure, unspecified congestive heart failure chronicity, unspecified congestive heart failure type (H)  Comment: stable.  Plan: No change in current treatment plan.     (E06.9) Thyroiditis  Comment: past  Plan: TSH    Check with pharmacy about tetanus vaccine.              End of Life Planning:  Patient currently has an advanced directive: No.  I have verified the patient's ablity to prepare an advanced directive/make health care decisions.  Literature was provided to assist patient in preparing an advanced directive.    COUNSELING:      BP Readings from Last 1 Encounters:   07/16/18 144/74     Estimated body mass index is 26.75 kg/(m^2) as calculated from the following:    Height as of this encounter: 5' 8.75\" (1.746 m).    Weight as of this encounter: 179 lb 12.8 oz (81.6 kg).       reports that he has never smoked. He has never used smokeless tobacco.      Appropriate preventive services were discussed with this patient, including applicable screening as appropriate for cardiovascular disease, diabetes, osteopenia/osteoporosis, and glaucoma.  " As appropriate for age/gender, discussed screening for colorectal cancer, prostate cancer, breast cancer, and cervical cancer. Checklist reviewing preventive services available has been given to the patient.    Reviewed patients plan of care and provided an AVS. The Basic Care Plan (routine screening as documented in Health Maintenance) for Dave meets the Care Plan requirement. This Care Plan has been established and reviewed with the Patient.    Counseling Resources:  ATP IV Guidelines  Pooled Cohorts Equation Calculator  Breast Cancer Risk Calculator  FRAX Risk Assessment  ICSI Preventive Guidelines  Dietary Guidelines for Americans, 2010  USDA's MyPlate  ASA Prophylaxis  Lung CA Screening    Raymon Huang MD  [facility]

## 2018-07-18 ENCOUNTER — HOSPITAL ENCOUNTER (OUTPATIENT)
Dept: CARDIOLOGY | Facility: CLINIC | Age: 78
Discharge: HOME | End: 2018-07-18
Attending: FAMILY MEDICINE | Admitting: FAMILY MEDICINE
Payer: MEDICARE

## 2018-07-18 DIAGNOSIS — I25.10 CORONARY ARTERY DISEASE INVOLVING NATIVE HEART WITHOUT ANGINA PECTORIS, UNSPECIFIED VESSEL OR LESION TYPE: ICD-10-CM

## 2018-07-18 PROCEDURE — 93306 TTE W/DOPPLER COMPLETE: CPT

## 2018-07-18 PROCEDURE — 25500064 ZZH RX 255 OP 636: Performed by: FAMILY MEDICINE

## 2018-07-18 PROCEDURE — 93306 TTE W/DOPPLER COMPLETE: CPT | Mod: 26 | Performed by: INTERNAL MEDICINE

## 2018-07-18 RX ADMIN — HUMAN ALBUMIN MICROSPHERES AND PERFLUTREN 2 ML: 10; .22 INJECTION, SOLUTION INTRAVENOUS at 10:19

## 2018-07-26 ENCOUNTER — OFFICE VISIT (OUTPATIENT)
Dept: CARDIOLOGY | Facility: CLINIC | Age: 78
End: 2018-07-26
Attending: INTERNAL MEDICINE
Payer: COMMERCIAL

## 2018-07-26 VITALS
SYSTOLIC BLOOD PRESSURE: 116 MMHG | OXYGEN SATURATION: 97 % | WEIGHT: 179.89 LBS | DIASTOLIC BLOOD PRESSURE: 68 MMHG | BODY MASS INDEX: 26.76 KG/M2 | HEART RATE: 64 BPM

## 2018-07-26 DIAGNOSIS — I25.10 CORONARY ARTERY DISEASE INVOLVING NATIVE HEART WITHOUT ANGINA PECTORIS, UNSPECIFIED VESSEL OR LESION TYPE: ICD-10-CM

## 2018-07-26 PROCEDURE — 99214 OFFICE O/P EST MOD 30 MIN: CPT | Performed by: INTERNAL MEDICINE

## 2018-07-26 RX ORDER — HYDROCHLOROTHIAZIDE 12.5 MG/1
12.5 TABLET ORAL DAILY
Qty: 90 TABLET | Refills: 3 | Status: SHIPPED | OUTPATIENT
Start: 2018-07-26 | End: 2019-07-19

## 2018-07-26 NOTE — MR AVS SNAPSHOT
After Visit Summary   7/26/2018    Dave Spann    MRN: [id number]           Patient Information     Date Of Birth          1940        Visit Information        Provider Department      7/26/2018 2:00 PM Vijaya Hernandez MD [facility]        Today's Diagnoses     Coronary artery disease involving native heart without angina pectoris, unspecified vessel or lesion type          Care Instructions    Thank you for your M Heart Care visit today. Your provider has recommended the following:  Medication Changes:  No Medication Changes at your Heart Care appointment today 7/26/2018  Recommendations:  Have fasting lab work in 1 year    Follow-up:  See Dr. Hernandez for cardiology follow up in 1 year    We kindly ask that you call cardiology scheduling at [phone] three months prior to requested revisit date to schedule future cardiology appointments.  Reminder:  1. Please bring in your current medication list or your medication, over the counter supplements and vitamin bottles as we will review these at each office visit.               [facility]~[address]~[place], [place]~[address]  Questions about your visit today?  Call your Cardiology Clinic RN's-Belén Pena and/or Jackie Leach at [phone].          Follow-ups after your visit        Additional Services     Follow-Up with Cardiologist                 Your next 10 appointments already scheduled     Jul 26, 2018  2:00 PM CDT   Return Visit with Vijaya Hernanedz MD   [facility] ([facility])    [address] [id number] [phone]              Future tests that were ordered for you today     Open Future Orders        Priority Expected Expires Ordered    Lipid Profile Routine 7/26/2019 7/27/2019 7/26/2018    Basic metabolic panel Routine 7/26/2019 7/27/2019  7/26/2018    Follow-Up with Cardiologist Routine 7/26/2019 7/27/2019 7/26/2018    ALT Routine 7/26/2019 7/27/2019 7/26/2018            Who to contact     If you have questions or need follow up information about today's clinic visit or your schedule please contact [facility] directly at [phone].  Normal or non-critical lab and imaging results will be communicated to you by TutorDudeshart, letter or phone within 4 business days after the clinic has received the results. If you do not hear from us within 7 days, please contact the clinic through TutorDudeshart or phone. If you have a critical or abnormal lab result, we will notify you by phone as soon as possible.  Submit refill requests through Vidmaker or call your pharmacy and they will forward the refill request to us. Please allow 3 business days for your refill to be completed.          Additional Information About Your Visit        [employer] Information     Vidmaker gives you secure access to your electronic health record. If you see a primary care provider, you can also send messages to your care team and make appointments. If you have questions, please call your primary care clinic.  If you do not have a primary care provider, please call [phone] and they will assist you.        Care EveryWhere ID     This is your Care EveryWhere ID. This could be used by other organizations to access your [facility] medical records  [id number]        Your Vitals Were     Pulse Pulse Oximetry BMI (Body Mass Index)             64 97% 26.76 kg/m2          Blood Pressure from Last 3 Encounters:   07/26/18 116/68   07/16/18 136/70   09/14/17 123/70    Weight from Last 3 Encounters:   07/26/18 81.6 kg (179 lb 14.2 oz)   07/16/18 81.6 kg (179 lb 12.8 oz)   09/14/17 79.4 kg (175 lb)              We Performed the Following     Follow-Up with Cardiologist        Primary Care Provider Office Phone # Fax #    Raymon Huang [phone]  [phone]       [address] [place] [id number]        Equal Access to Services     ANNSILVIA JONATAN : Hadii aad ku hadnazleonor Weiss, wajamesda cristina, qajam souzadeclanj carlos hurt, judy colladobrendamadelyn gee. So [facility] [phone].    ATENCIÓN: Si habla español, tiene a mancuso disposición servicios gratuitos de asistencia lingüística. Adelaida al [phone].    We comply with applicable federal civil rights laws and [place] laws. We do not discriminate on the basis of race, color, national origin, age, disability, sex, sexual orientation, or gender identity.            Thank you!     Thank you for choosing [facility]  for your care. Our goal is always to provide you with excellent care. Hearing back from our patients is one way we can continue to improve our services. Please take a few minutes to complete the written survey that you may receive in the mail after your visit with us. Thank you!             Your Updated Medication List - Protect others around you: Learn how to safely use, store and throw away your medicines at www.disposemymeds.org.          This list is accurate as of 7/26/18  2:00 PM.  Always use your most recent med list.                   Brand Name Dispense Instructions for use Diagnosis    aspirin 81 MG tablet      1 TABLET DAILY        atorvastatin 40 MG tablet    LIPITOR    90 tablet    TAKE ONE TABLET BY MOUTH AT BEDTIME    Hyperlipidemia LDL goal <130       CALCIUM 500 500-250-200 MG-MG-UNIT Tabs   Generic drug:  Calcium-Magnesium-Vitamin D      Take 1 tablet by mouth 2 times daily        doxazosin 8 MG tablet    CARDURA    90 tablet    Take 1 tablet (8 mg) by mouth At Bedtime    Essential hypertension with goal blood pressure less than 140/90, Benign non-nodular prostatic hyperplasia with lower urinary tract symptoms       flaxseed oil 1000 MG Caps      1 DAILY        hydrochlorothiazide 12.5 MG Tabs tablet     90 tablet    Take 1 tablet  (12.5 mg) by mouth daily    Coronary artery disease involving native heart without angina pectoris, unspecified vessel or lesion type       losartan 100 MG tablet    COZAAR    90 tablet    Take 1 tablet (100 mg) by mouth daily    Essential hypertension with goal blood pressure less than 140/90       metoprolol tartrate 100 MG tablet    LOPRESSOR    180 tablet    Take 1 tablet (100 mg) by mouth 2 times daily    Essential hypertension with goal blood pressure less than 140/90       MULTI VITAMIN MENS PO      Take 1 tablet by mouth daily.

## 2018-07-26 NOTE — PROGRESS NOTES
Dear Dr. Huang:      I had the pleasure to follow up with your patient, Mr. Dave Spann, in the Cardiovascular Medicine Clinic.  As you recall, this is a gentleman who in Feb 2015 was transferred to [facility] [place] for coronary angiogram.  Briefly, this study was performed due to chest discomfort with exertion.  There was concern regarding this representing a high-grade coronary artery lesion.  He underwent coronary angiogram via right radial approach demonstrating intermediate disease in the LAD which was evaluated by fractional flow reserve assessment.  This, fortunately, was 0.84 suggestive of nonobstructive disease.  He also was uncovered to have hyperthyroidism and has been maintained on beta blocker therapy since that time.  He is scheduled to have an evaluation next week for this and treatment as well.  From a cardiac perspective, he reports no additional symptoms.  He has gone back to his normal level of functioning.  In the winter months, he is quite active in the garage.  He likes woodworking and in the summer he has a large garden which he tends to.      Had thyroiditis which has since resolved.    He has no active cardiac complaints. LE edema with Norvasc which has been discontinued.  Has done well with hydrochlorothiazide.       Echocardiogram: July 2018  Essentially normal study for age.  The visual ejection fraction is estimated at 55-60%.  Normal left ventricular wall motion  The right ventricle is normal in size and function.  Compared to prior study, there is no significant change.    PHYSICAL EXAMINATION:   VITAL SIGNS:  Blood pressure 116/68, pulse 64, weight 81.6 kg (179 lb 14.2 oz), SpO2 97 %.  GENERAL:  The patient is alert, oriented, well-dressed, well-appearing male in no apparent distress.   HEENT:  Oropharynx clear, no sinus tenderness.   NECK:  No JVP, no lymphadenopathy, no carotid bruits.   CARDIOVASCULAR:  Distant heart tones, normal S1 and S2.  No murmurs, gallops or rubs.    LUNGS:  Clear to auscultation.   ABDOMEN:  Soft, nontender and nondistended.      ASSESSMENT AND PLAN:   1. Atherosclerotic coronary disease, mild, nonobstructive.   2. Hypertension.   3. Hyperlipidemia.      RECOMMENDATIONS:   1. Atherosclerotic coronary artery disease.  At this point, the patient is stable.  We have reviewed his recent coronary angiogram. 2. Hyperlipidemia.  Let us continue with his atorvastatin at goal  3. HTN:  Stable (had LE edema on Norvasc)  4. Reviewed echo with patient.    5. RTC in one year with pre clinic lab work.      Vijaya Hernandez MD

## 2018-07-26 NOTE — PATIENT INSTRUCTIONS
Thank you for your M Heart Care visit today. Your provider has recommended the following:  Medication Changes:  No Medication Changes at your Heart Care appointment today 7/26/2018  Recommendations:  Have fasting lab work in 1 year    Follow-up:  See Dr. Hernandez for cardiology follow up in 1 year    We kindly ask that you call cardiology scheduling at [phone] three months prior to requested revisit date to schedule future cardiology appointments.  Reminder:  1. Please bring in your current medication list or your medication, over the counter supplements and vitamin bottles as we will review these at each office visit.               [facility]  [facility]~[address]~[place], [place]~[address]  Questions about your visit today?  Call your Cardiology Clinic RN's-Belén Pena and/or Jackie Leach at [phone].

## 2018-07-26 NOTE — LETTER
7/26/2018    Raymon Huang MD  [address] [facility] [address]    RE: Dave Spann       Dear Colleague,    I had the pleasure of seeing Dave Spann in the [facility].       Dear Dr. Huang:      I had the pleasure to follow up with your patient, Mr. Dave Spann, in the Cardiovascular Medicine Clinic.  As you recall, this is a gentleman who in Feb 2015 was transferred to [facility] for coronary angiogram.  Briefly, this study was performed due to chest discomfort with exertion.  There was concern regarding this representing a high-grade coronary artery lesion.  He underwent coronary angiogram via right radial approach demonstrating intermediate disease in the LAD which was evaluated by fractional flow reserve assessment.  This, fortunately, was 0.84 suggestive of nonobstructive disease.  He also was uncovered to have hyperthyroidism and has been maintained on beta blocker therapy since that time.  He is scheduled to have an evaluation next week for this and treatment as well.  From a cardiac perspective, he reports no additional symptoms.  He has gone back to his normal level of functioning.  In the winter months, he is quite active in the garage.  He likes woodworking and in the summer he has a large garden which he tends to.      Had thyroiditis which has since resolved.    He has no active cardiac complaints. LE edema with Norvasc which has been discontinued.  Has done well with hydrochlorothiazide.       Echocardiogram: July 2018  Essentially normal study for age.  The visual ejection fraction is estimated at 55-60%.  Normal left ventricular wall motion  The right ventricle is normal in size and function.  Compared to prior study, there is no significant change.    PHYSICAL EXAMINATION:   VITAL SIGNS:  Blood pressure 116/68, pulse 64, weight 81.6 kg (179 lb 14.2 oz), SpO2 97 %.  GENERAL:  The patient is alert, oriented, well-dressed, well-appearing  male in no apparent distress.   HEENT:  Oropharynx clear, no sinus tenderness.   NECK:  No JVP, no lymphadenopathy, no carotid bruits.   CARDIOVASCULAR:  Distant heart tones, normal S1 and S2.  No murmurs, gallops or rubs.   LUNGS:  Clear to auscultation.   ABDOMEN:  Soft, nontender and nondistended.      ASSESSMENT AND PLAN:   1. Atherosclerotic coronary disease, mild, nonobstructive.   2. Hypertension.   3. Hyperlipidemia.      RECOMMENDATIONS:   1. Atherosclerotic coronary artery disease.  At this point, the patient is stable.  We have reviewed his recent coronary angiogram. 2. Hyperlipidemia.  Let us continue with his atorvastatin at goal  3. HTN:  Stable (had LE edema on Norvasc)  4. Reviewed echo with patient.    5. RTC in one year with pre clinic lab work.      Thank you for allowing me to participate in the care of your patient.    Sincerely,     Vijaya Hernandez MD     [facility]

## 2018-07-26 NOTE — LETTER
7/26/2018    Raymon Huang MD  [address] [facility] [address]    RE: Dave Spann       Dear Colleague,    I had the pleasure of seeing Dave Spann in the [facility].       Dear Dr. Huang:      I had the pleasure to follow up with your patient, Mr. Dave Spann, in the Cardiovascular Medicine Clinic.  As you recall, this is a gentleman who in Feb 2015 was transferred to [facility] for coronary angiogram.  Briefly, this study was performed due to chest discomfort with exertion.  There was concern regarding this representing a high-grade coronary artery lesion.  He underwent coronary angiogram via right radial approach demonstrating intermediate disease in the LAD which was evaluated by fractional flow reserve assessment.  This, fortunately, was 0.84 suggestive of nonobstructive disease.  He also was uncovered to have hyperthyroidism and has been maintained on beta blocker therapy since that time.  He is scheduled to have an evaluation next week for this and treatment as well.  From a cardiac perspective, he reports no additional symptoms.  He has gone back to his normal level of functioning.  In the winter months, he is quite active in the garage.  He likes woodworking and in the summer he has a large garden which he tends to.      Had thyroiditis which has since resolved.    He has no active cardiac complaints. LE edema with Norvasc which has been discontinued.  Has done well with hydrochlorothiazide.       Echocardiogram: July 2018  Essentially normal study for age.  The visual ejection fraction is estimated at 55-60%.  Normal left ventricular wall motion  The right ventricle is normal in size and function.  Compared to prior study, there is no significant change.    PHYSICAL EXAMINATION:   VITAL SIGNS:  Blood pressure 116/68, pulse 64, weight 81.6 kg (179 lb 14.2 oz), SpO2 97 %.  GENERAL:  The patient is alert, oriented, well-dressed, well-appearing  male in no apparent distress.   HEENT:  Oropharynx clear, no sinus tenderness.   NECK:  No JVP, no lymphadenopathy, no carotid bruits.   CARDIOVASCULAR:  Distant heart tones, normal S1 and S2.  No murmurs, gallops or rubs.   LUNGS:  Clear to auscultation.   ABDOMEN:  Soft, nontender and nondistended.      ASSESSMENT AND PLAN:   1. Atherosclerotic coronary disease, mild, nonobstructive.   2. Hypertension.   3. Hyperlipidemia.      RECOMMENDATIONS:   1. Atherosclerotic coronary artery disease.  At this point, the patient is stable.  We have reviewed his recent coronary angiogram. 2. Hyperlipidemia.  Let us continue with his atorvastatin at goal  3. HTN:  Stable (had LE edema on Norvasc)  4. Reviewed echo with patient.    5. RTC in one year with pre clinic lab work.      Vijaya Hernandez MD      Thank you for allowing me to participate in the care of your patient.      Sincerely,     Vijaya Hernandez MD     [facility]    cc:   Vijaya Hernandez MD  [address] [facility] [address]  [place] [address]

## 2019-04-03 ENCOUNTER — TELEPHONE (OUTPATIENT)
Dept: FAMILY MEDICINE | Facility: CLINIC | Age: 79
End: 2019-04-03

## 2019-04-03 NOTE — TELEPHONE ENCOUNTER
Please call.  If he is having symptoms or problems, come in to see me.    If no symptoms and this was just found incidentally we can discuss when he comes in for his next visit.   KERON ESCOBEDO MD

## 2019-04-03 NOTE — TELEPHONE ENCOUNTER
I talked with Dave and he is not having any problems.  Will discuss at next office visit  Anny Abbott RN

## 2019-04-03 NOTE — TELEPHONE ENCOUNTER
Rachel Quintanilla NP with [facility] called. ([phone]) She did a circulation test (PAD). She says the Right leg normal. The left leg was moderate. She is asking for Dr Huang to do any follow up needed.

## 2019-04-03 NOTE — TELEPHONE ENCOUNTER
Dr Huang-Do you want us to set up an appointment. Pt currently not due for anything at this time. HX of CAD/CHF. Danii Scanlon RN

## 2019-07-11 DIAGNOSIS — I25.10 CORONARY ARTERY DISEASE INVOLVING NATIVE HEART WITHOUT ANGINA PECTORIS, UNSPECIFIED VESSEL OR LESION TYPE: ICD-10-CM

## 2019-07-11 LAB
ALT SERPL W P-5'-P-CCNC: 48 U/L (ref 0–70)
ANION GAP SERPL CALCULATED.3IONS-SCNC: 6 MMOL/L (ref 3–14)
BUN SERPL-MCNC: 19 MG/DL (ref 7–30)
CALCIUM SERPL-MCNC: 9.6 MG/DL (ref 8.5–10.1)
CHLORIDE SERPL-SCNC: 105 MMOL/L (ref 94–109)
CHOLEST SERPL-MCNC: 111 MG/DL
CO2 SERPL-SCNC: 27 MMOL/L (ref 20–32)
CREAT SERPL-MCNC: 0.96 MG/DL (ref 0.66–1.25)
GFR SERPL CREATININE-BSD FRML MDRD: 76 ML/MIN/{1.73_M2}
GLUCOSE SERPL-MCNC: 108 MG/DL (ref 70–99)
HDLC SERPL-MCNC: 51 MG/DL
LDLC SERPL CALC-MCNC: 45 MG/DL
NONHDLC SERPL-MCNC: 60 MG/DL
POTASSIUM SERPL-SCNC: 4.2 MMOL/L (ref 3.4–5.3)
SODIUM SERPL-SCNC: 138 MMOL/L (ref 133–144)
TRIGL SERPL-MCNC: 74 MG/DL

## 2019-07-11 PROCEDURE — 80061 LIPID PANEL: CPT | Performed by: INTERNAL MEDICINE

## 2019-07-11 PROCEDURE — 36415 COLL VENOUS BLD VENIPUNCTURE: CPT | Performed by: INTERNAL MEDICINE

## 2019-07-11 PROCEDURE — 84460 ALANINE AMINO (ALT) (SGPT): CPT | Performed by: INTERNAL MEDICINE

## 2019-07-11 PROCEDURE — 80048 BASIC METABOLIC PNL TOTAL CA: CPT | Performed by: INTERNAL MEDICINE

## 2019-07-16 ENCOUNTER — OFFICE VISIT (OUTPATIENT)
Dept: CARDIOLOGY | Facility: CLINIC | Age: 79
End: 2019-07-16
Attending: INTERNAL MEDICINE
Payer: COMMERCIAL

## 2019-07-16 VITALS
DIASTOLIC BLOOD PRESSURE: 73 MMHG | OXYGEN SATURATION: 97 % | BODY MASS INDEX: 26.78 KG/M2 | HEART RATE: 67 BPM | SYSTOLIC BLOOD PRESSURE: 138 MMHG | WEIGHT: 180 LBS

## 2019-07-16 DIAGNOSIS — I25.10 CORONARY ARTERY DISEASE INVOLVING NATIVE HEART WITHOUT ANGINA PECTORIS, UNSPECIFIED VESSEL OR LESION TYPE: ICD-10-CM

## 2019-07-16 PROCEDURE — 99214 OFFICE O/P EST MOD 30 MIN: CPT | Performed by: INTERNAL MEDICINE

## 2019-07-16 ASSESSMENT — ENCOUNTER SYMPTOMS
COUGH: 0
CHILLS: 0
EYE PAIN: 0
HEARTBURN: 0
HEMATURIA: 0
JOINT SWELLING: 0
DYSURIA: 0
DIZZINESS: 0
DIARRHEA: 0
FEVER: 0
NERVOUS/ANXIOUS: 0
PARESTHESIAS: 0
SORE THROAT: 0
SHORTNESS OF BREATH: 0
ARTHRALGIAS: 0
HEADACHES: 0
CONSTIPATION: 1
MYALGIAS: 0
WEAKNESS: 0
PALPITATIONS: 0
HEMATOCHEZIA: 0
ABDOMINAL PAIN: 0
FREQUENCY: 1
NAUSEA: 0

## 2019-07-16 ASSESSMENT — ACTIVITIES OF DAILY LIVING (ADL): CURRENT_FUNCTION: NO ASSISTANCE NEEDED

## 2019-07-16 NOTE — LETTER
7/16/2019    Raymon Huang MD  [address] [place] [id number]    RE: Dave Spann       Dear Colleague,    I had the pleasure of seeing Dave Spann in the [facility].       Dear Dr. Huang:      I had the pleasure to follow up with your patient, Mr. Dave Spann, in the Cardiovascular Medicine Clinic.  As you recall, this is a gentleman who in Feb 2015 was transferred to [facility] for coronary angiogram.  Briefly, this study was performed due to chest discomfort with exertion.  There was concern regarding this representing a high-grade coronary artery lesion.  He underwent coronary angiogram via right radial approach demonstrating intermediate disease in the LAD which was evaluated by fractional flow reserve assessment.  This, fortunately, was 0.84 suggestive of nonobstructive disease.  He also was uncovered to have hyperthyroidism and has been maintained on beta blocker therapy since that time.   From a cardiac perspective, he reports no additional symptoms.  He has gone back to his normal level of functioning.  In the winter months, he is quite active in the garage.  He likes woodworking and in the summer he has a large garden which he tends to.      Had thyroiditis which has since resolved.    He has no active cardiac complaints. LE edema with Norvasc which has been discontinued.  Has done well with hydrochlorothiazide.       Echocardiogram: July 2018  Essentially normal study for age.  The visual ejection fraction is estimated at 55-60%.  Normal left ventricular wall motion  The right ventricle is normal in size and function.  Compared to prior study, there is no significant change.    PHYSICAL EXAMINATION:   VITAL SIGNS:  Blood pressure 138/73, pulse 67, weight 81.6 kg (180 lb), SpO2 97 %.  GENERAL:  The patient is alert, oriented, well-dressed, well-appearing male in no apparent distress.   HEENT:  Oropharynx clear, no sinus tenderness.   NECK:  No  JVP, no lymphadenopathy, no carotid bruits.   CARDIOVASCULAR:  Distant heart tones, normal S1 and S2.  No murmurs, gallops or rubs.   LUNGS:  Clear to auscultation.   ABDOMEN:  Soft, nontender and nondistended.      ASSESSMENT AND PLAN:   1. Atherosclerotic coronary disease, mild, nonobstructive.   2. Hypertension.   3. Hyperlipidemia.      RECOMMENDATIONS:   1. Atherosclerotic coronary artery disease.  No return of symptoms  2. Hyperlipidemia.  Let us continue with his atorvastatin at goal  3. HTN:  Stable (had LE edema on Norvasc)  4. RTC in one year with pre clinic lab work.        Thank you for allowing me to participate in the care of your patient.    Sincerely,     Vijaya Hernandez MD     [facility]

## 2019-07-16 NOTE — PROGRESS NOTES
Dear Dr. Huang:      I had the pleasure to follow up with your patient, Mr. Dave Spann, in the Cardiovascular Medicine Clinic.  As you recall, this is a gentleman who in Feb 2015 was transferred to [facility] for coronary angiogram.  Briefly, this study was performed due to chest discomfort with exertion.  There was concern regarding this representing a high-grade coronary artery lesion.  He underwent coronary angiogram via right radial approach demonstrating intermediate disease in the LAD which was evaluated by fractional flow reserve assessment.  This, fortunately, was 0.84 suggestive of nonobstructive disease.  He also was uncovered to have hyperthyroidism and has been maintained on beta blocker therapy since that time.   From a cardiac perspective, he reports no additional symptoms.  He has gone back to his normal level of functioning.  In the winter months, he is quite active in the garage.  He likes woodworking and in the summer he has a large garden which he tends to.      Had thyroiditis which has since resolved.    He has no active cardiac complaints. LE edema with Norvasc which has been discontinued.  Has done well with hydrochlorothiazide.       Echocardiogram: July 2018  Essentially normal study for age.  The visual ejection fraction is estimated at 55-60%.  Normal left ventricular wall motion  The right ventricle is normal in size and function.  Compared to prior study, there is no significant change.    PHYSICAL EXAMINATION:   VITAL SIGNS:  Blood pressure 138/73, pulse 67, weight 81.6 kg (180 lb), SpO2 97 %.  GENERAL:  The patient is alert, oriented, well-dressed, well-appearing male in no apparent distress.   HEENT:  Oropharynx clear, no sinus tenderness.   NECK:  No JVP, no lymphadenopathy, no carotid bruits.   CARDIOVASCULAR:  Distant heart tones, normal S1 and S2.  No murmurs, gallops or rubs.   LUNGS:  Clear to auscultation.   ABDOMEN:  Soft, nontender and nondistended.       ASSESSMENT AND PLAN:   1. Atherosclerotic coronary disease, mild, nonobstructive.   2. Hypertension.   3. Hyperlipidemia.      RECOMMENDATIONS:   1. Atherosclerotic coronary artery disease.  No return of symptoms  2. Hyperlipidemia.  Let us continue with his atorvastatin at goal  3. HTN:  Stable (had LE edema on Norvasc)  4. RTC in one year with pre clinic lab work.      Vijaya Hernandez MD

## 2019-07-16 NOTE — LETTER
7/16/2019    Raymon Huang MD  [address] [place] [id number]    RE: Dave Spann       Dear Colleague,    I had the pleasure of seeing Dave Spann in the [facility].       Dear Dr. Huang:      I had the pleasure to follow up with your patient, Mr. Dave Spann, in the Cardiovascular Medicine Clinic.  As you recall, this is a gentleman who in Feb 2015 was transferred to [facility] for coronary angiogram.  Briefly, this study was performed due to chest discomfort with exertion.  There was concern regarding this representing a high-grade coronary artery lesion.  He underwent coronary angiogram via right radial approach demonstrating intermediate disease in the LAD which was evaluated by fractional flow reserve assessment.  This, fortunately, was 0.84 suggestive of nonobstructive disease.  He also was uncovered to have hyperthyroidism and has been maintained on beta blocker therapy since that time.   From a cardiac perspective, he reports no additional symptoms.  He has gone back to his normal level of functioning.  In the winter months, he is quite active in the garage.  He likes woodworking and in the summer he has a large garden which he tends to.      Had thyroiditis which has since resolved.    He has no active cardiac complaints. LE edema with Norvasc which has been discontinued.  Has done well with hydrochlorothiazide.       Echocardiogram: July 2018  Essentially normal study for age.  The visual ejection fraction is estimated at 55-60%.  Normal left ventricular wall motion  The right ventricle is normal in size and function.  Compared to prior study, there is no significant change.    PHYSICAL EXAMINATION:   VITAL SIGNS:  Blood pressure 138/73, pulse 67, weight 81.6 kg (180 lb), SpO2 97 %.  GENERAL:  The patient is alert, oriented, well-dressed, well-appearing male in no apparent distress.   HEENT:  Oropharynx clear, no sinus tenderness.   NECK:  No  JVP, no lymphadenopathy, no carotid bruits.   CARDIOVASCULAR:  Distant heart tones, normal S1 and S2.  No murmurs, gallops or rubs.   LUNGS:  Clear to auscultation.   ABDOMEN:  Soft, nontender and nondistended.      ASSESSMENT AND PLAN:   1. Atherosclerotic coronary disease, mild, nonobstructive.   2. Hypertension.   3. Hyperlipidemia.      RECOMMENDATIONS:   1. Atherosclerotic coronary artery disease.  No return of symptoms  2. Hyperlipidemia.  Let us continue with his atorvastatin at goal  3. HTN:  Stable (had LE edema on Norvasc)  4. RTC in one year with pre clinic lab work.      Vijaya Hernandez MD      Thank you for allowing me to participate in the care of your patient.      Sincerely,     iVjaya Hernandez MD     [facility]    cc:   Vijaya Hernandez MD  [address] [facility] [address] [place] [id number]

## 2019-07-19 ENCOUNTER — OFFICE VISIT (OUTPATIENT)
Dept: FAMILY MEDICINE | Facility: CLINIC | Age: 79
End: 2019-07-19
Payer: COMMERCIAL

## 2019-07-19 VITALS
RESPIRATION RATE: 16 BRPM | BODY MASS INDEX: 25.62 KG/M2 | WEIGHT: 179 LBS | TEMPERATURE: 96.6 F | HEIGHT: 70 IN | SYSTOLIC BLOOD PRESSURE: 138 MMHG | HEART RATE: 68 BPM | DIASTOLIC BLOOD PRESSURE: 72 MMHG

## 2019-07-19 DIAGNOSIS — E06.9 THYROIDITIS: ICD-10-CM

## 2019-07-19 DIAGNOSIS — I25.10 ASCVD (ARTERIOSCLEROTIC CARDIOVASCULAR DISEASE): ICD-10-CM

## 2019-07-19 DIAGNOSIS — Z00.00 MEDICARE ANNUAL WELLNESS VISIT, SUBSEQUENT: Primary | ICD-10-CM

## 2019-07-19 DIAGNOSIS — E78.5 HYPERLIPIDEMIA LDL GOAL <130: ICD-10-CM

## 2019-07-19 DIAGNOSIS — I10 ESSENTIAL HYPERTENSION WITH GOAL BLOOD PRESSURE LESS THAN 140/90: ICD-10-CM

## 2019-07-19 DIAGNOSIS — N40.1 BENIGN NON-NODULAR PROSTATIC HYPERPLASIA WITH LOWER URINARY TRACT SYMPTOMS: ICD-10-CM

## 2019-07-19 PROCEDURE — 99397 PER PM REEVAL EST PAT 65+ YR: CPT | Performed by: FAMILY MEDICINE

## 2019-07-19 RX ORDER — LOSARTAN POTASSIUM 100 MG/1
100 TABLET ORAL DAILY
Qty: 90 TABLET | Refills: 3 | Status: SHIPPED | OUTPATIENT
Start: 2019-07-19 | End: 2020-07-16

## 2019-07-19 RX ORDER — METOPROLOL TARTRATE 100 MG
100 TABLET ORAL 2 TIMES DAILY
Qty: 180 TABLET | Refills: 3 | Status: SHIPPED | OUTPATIENT
Start: 2019-07-19 | End: 2020-07-16

## 2019-07-19 RX ORDER — HYDROCHLOROTHIAZIDE 12.5 MG/1
12.5 TABLET ORAL DAILY
Qty: 90 TABLET | Refills: 3 | Status: SHIPPED | OUTPATIENT
Start: 2019-07-19 | End: 2020-07-16

## 2019-07-19 RX ORDER — DOXAZOSIN 4 MG/1
TABLET ORAL
Qty: 90 TABLET | Refills: 3 | Status: SHIPPED | OUTPATIENT
Start: 2019-07-19 | End: 2020-07-16

## 2019-07-19 RX ORDER — ATORVASTATIN CALCIUM 40 MG/1
TABLET, FILM COATED ORAL
Qty: 90 TABLET | Refills: 3 | Status: SHIPPED | OUTPATIENT
Start: 2019-07-19 | End: 2020-07-16

## 2019-07-19 ASSESSMENT — ENCOUNTER SYMPTOMS
ABDOMINAL PAIN: 0
HEADACHES: 0
CHILLS: 0
CONSTIPATION: 1
NAUSEA: 0
COUGH: 0
EYE PAIN: 0
FEVER: 0
NERVOUS/ANXIOUS: 0
HEARTBURN: 0
PALPITATIONS: 0
FREQUENCY: 1
ARTHRALGIAS: 0
SHORTNESS OF BREATH: 0
HEMATOCHEZIA: 0
WEAKNESS: 0
DIZZINESS: 0
DIARRHEA: 0
HEMATURIA: 0
JOINT SWELLING: 0
SORE THROAT: 0
PARESTHESIAS: 0
MYALGIAS: 0
DYSURIA: 0

## 2019-07-19 ASSESSMENT — MIFFLIN-ST. JEOR: SCORE: 1540.25

## 2019-07-19 ASSESSMENT — ACTIVITIES OF DAILY LIVING (ADL): CURRENT_FUNCTION: NO ASSISTANCE NEEDED

## 2019-07-19 NOTE — PATIENT INSTRUCTIONS
ASSESSMENT/PLAN:                                                    Lifestyle recommendations:continue current healthy lifestyle efforts including regular exercise and keeping a good weight  The following exams/tests were recommended and discussed for health maintenance:  When to stop colon cancer screening?  Experts agree that colon cancer screening is generally not recommended when life expectancy is <10 years and not at age over 85.  The Expert group USPSTF recommends against screening for ages 76 and older.  It is reasonable to stop screening for colon cancer sometime between age 76 and 85 for most individuals.   Prostate cancer screening is not recommended for older men, those age 70 and older or with anticipated lifespan <10 years.  The expert group USPSTF rcommmends against any PSA prostate cancer screening.      (Z00.00) Medicare annual wellness visit, subsequent  (primary encounter diagnosis)    (I25.10) ASCVD (arteriosclerotic cardiovascular disease)  Comment: heart has been doing well  Plan: hydrochlorothiazide (HYDRODIURIL) 12.5 MG         tablet        No change in current treatment plan.     (E78.5) Hyperlipidemia LDL goal <130  Comment: doing well  Plan: atorvastatin (LIPITOR) 40 MG tablet        No change in current treatment plan.  Refills as needed.     (E06.9) Thyroiditis  Comment:   Plan: TSH with free T4 reflex        Check thyroid with blood tests next year.  Has been OK for the last 4 years.     (I10) Essential hypertension with goal blood pressure less than 140/90  Comment: doing well  Plan: doxazosin (CARDURA) 4 MG tablet, losartan         (COZAAR) 100 MG tablet, metoprolol tartrate         (LOPRESSOR) 100 MG tablet        No change in current treatment plan.  Refills.     (N40.1) Benign non-nodular prostatic hyperplasia with lower urinary tract symptoms  Comment: stable  Plan: doxazosin (CARDURA) 4 MG tablet        No change in current treatment plan.

## 2019-07-19 NOTE — PROGRESS NOTES
"SUBJECTIVE:   Dave Spann is a 78 year old male who presents for Preventive Visit.  Chief Complaint   Patient presents with     Physical      He had cardiology visit on 7/16/2019.  No changes made.      No health changes.  No concerns today.     Are you in the first 12 months of your Medicare coverage?  No    Healthy Habits:     In general, how would you rate your overall health?  Good    Frequency of exercise:  None    Do you usually eat at least 4 servings of fruit and vegetables a day, include whole grains    & fiber and avoid regularly eating high fat or \"junk\" foods?  Yes    Taking medications regularly:  Yes    Medication side effects:  None    Ability to successfully perform activities of daily living:  No assistance needed    Home Safety:  Lack of grab bars in the bathroom    Hearing Impairment:  Difficulty following a conversation in a noisy restaurant or crowded room, difficult to understand a speaker at a public meeting or Rastafari service and need to ask people to speak up or repeat themselves    In the past 6 months, have you been bothered by leaking of urine?  No    In general, how would you rate your overall mental or emotional health?  Good      PHQ-2 Total Score: 1    Additional concerns today:  No  Hearing not as good as it used to be but not causing too much problems.   Exercise:outside work.     Do you feel safe in your environment? Yes    Do you have a Health Care Directive? No: Advance care planning reviewed with patient; information given to patient to review.    Fall risk  Fallen 2 or more times in the past year?: Yes  Any fall with injury in the past year?: No    Cognitive Screening   1) Repeat 3 items (Leader, Season, Table)    2) Clock draw:   NORMAL  3) 3 item recall: Recalls 3 objects  Results: 3 items recalled: COGNITIVE IMPAIRMENT LESS LIKELY    Mini-CogTM Copyright REMY Campa. Licensed by the author for use in [facility]; reprinted with permission (tomasz@.[facility]). All " rights reserved.      Do you have sleep apnea, excessive snoring or daytime drowsiness?: no      Social History     Tobacco Use     Smoking status: Never Smoker     Smokeless tobacco: Never Used   Substance Use Topics     Alcohol use: Yes     Comment: One beer per year; sacramental wine       Alcohol Use 7/16/2019   Prescreen: >3 drinks/day or >7 drinks/week? No   Prescreen: >3 drinks/day or >7 drinks/week? -     Current providers sharing in care for this patient include:   Patient Care Team:  Raymon Huang MD as PCP - General (Family Practice)  Raymon Huang MD as Assigned PCP    The following health maintenance items are reviewed in Epic and correct as of today:  Health Maintenance   Topic Date Due     HF ACTION PLAN  1940     FALL RISK ASSESSMENT  07/16/2019     CBC  07/16/2019     MEDICARE ANNUAL WELLNESS VISIT  07/16/2019     INFLUENZA VACCINE (1) 09/01/2019     BMP  01/11/2020     ADVANCE CARE PLANNING  02/19/2020     ALT  07/11/2020     LIPID  07/11/2020     COLONOSCOPY  09/14/2027     DTAP/TDAP/TD IMMUNIZATION (5 - Td) 07/20/2028     PHQ-2  Completed     ZOSTER IMMUNIZATION  Completed     IPV IMMUNIZATION  Aged Out     MENINGITIS IMMUNIZATION  Aged Out     Patient Active Problem List    Diagnosis Date Noted     Thyroiditis 07/22/2015     Priority: Medium     He had episode thyroiditis with hyperthyroid in February, 2015 which seemed to resolve.        CHF (congestive heart failure) (H) 06/05/2015     Priority: Medium     Tachycardia 02/21/2015     Priority: Medium     ASCVD (arteriosclerotic cardiovascular disease) 02/20/2015     Priority: Medium     2/20/2016:Acute coronary syndrome:[facility] he had a cardiac cath, which he underwent on 2/20 and showed 50% LAD ostial lesion that will be treated medically.       Cataract, right eye 07/22/2013     Priority: Medium     Prediabetes 07/19/2012     Priority: Medium     HYPERLIPIDEMIA LDL GOAL <130 10/31/2010     Priority: Medium     BPH (benign  prostatic hypertrophy) 09/15/2010     Priority: Medium     OA (osteoarthritis) 07/15/2010     Priority: Medium     little fingers       Delayed gastric emptying 2010     Priority: Medium     Adrenal adenoma 2009     Priority: Medium     CT 09:  There is a 1.9 cm benign adrenal adenoma on the right.        Hypertension goal BP (blood pressure) < 140/90 2008     Priority: Medium     Lipoma of other specified sites 2006     Priority: Medium     right upper back-no changes for years       TINEA VERSICOLOR 2006     Priority: Medium     trunk       Advanced directives, counseling/discussion 2011     Priority: Low     Discussed advance care planning with patient; information given to patient to review. 2011          Health Care Home 2011     Priority: Low     hypertension treatment and monitoring  Annual exams.  DX V65.8 REPLACED WITH [address] [facility] CARE HOME (2013)          Family history:  CV disease: brother  Prostate cancer: no  Colon cancer: MGF    Multivitamin or Vit D use: MVI    Vaccines:current     Past Colon cancer screenin    Review of Systems   Constitutional: Negative for chills and fever.   HENT: Positive for hearing loss. Negative for congestion, ear pain and sore throat.    Eyes: Negative for pain and visual disturbance.   Respiratory: Negative for cough and shortness of breath.    Cardiovascular: Negative for chest pain, palpitations and peripheral edema.   Gastrointestinal: Positive for constipation. Negative for abdominal pain, diarrhea, heartburn, hematochezia and nausea.   Genitourinary: Positive for frequency. Negative for discharge, dysuria, genital sores, hematuria, impotence and urgency.   Musculoskeletal: Negative for arthralgias, joint swelling and myalgias.   Skin: Negative for rash.   Neurological: Negative for dizziness, weakness, headaches and paresthesias.   Psychiatric/Behavioral: Negative for mood changes. The patient is not  "nervous/anxious.    Constipation-not on medication.   Urinary frequency.  Not changed.  NocturiaX2-3    OBJECTIVE:                                                    OBJECTIVE:Blood pressure 138/72, pulse 68, temperature 96.6  F (35.9  C), temperature source Tympanic, resp. rate 16, height 1.765 m (5' 9.5\"), weight 81.2 kg (179 lb). BMI=Body mass index is 26.05 kg/m .  GENERAL APPEARANCE ADULT: Alert, no acute distress  EYES: PERRL, EOM normal, conjunctiva and lids normal  HENT: Ears and TMs normal, oral mucosa and posterior oropharynx normal  NECK: No adenopathy,masses or thyromegaly  RESP: lungs clear to auscultation   CV: normal rate, regular rhythm, no murmur or gallop  ABDOMEN: soft, no organomegaly, masses or tenderness  MS: extremities normal, no peripheral edema    ASSESSMENT/PLAN:                                                    Lifestyle recommendations:continue current healthy lifestyle efforts including regular exercise and keeping a good weight  The following exams/tests were recommended and discussed for health maintenance:  When to stop colon cancer screening?  Experts agree that colon cancer screening is generally not recommended when life expectancy is <10 years and not at age over 85.  The Expert group USPSTF recommends against screening for ages 76 and older.  It is reasonable to stop screening for colon cancer sometime between age 76 and 85 for most individuals.   Prostate cancer screening is not recommended for older men, those age 70 and older or with anticipated lifespan <10 years.  The expert group USPSTF rcommmends against any PSA prostate cancer screening.      (Z00.00) Medicare annual wellness visit, subsequent  (primary encounter diagnosis)    (I25.10) ASCVD (arteriosclerotic cardiovascular disease)  Comment: heart has been doing well  Plan: hydrochlorothiazide (HYDRODIURIL) 12.5 MG         tablet        No change in current treatment plan.     (E78.5) Hyperlipidemia LDL goal " "<130  Comment: doing well  Plan: atorvastatin (LIPITOR) 40 MG tablet        No change in current treatment plan.  Refills as needed.     (E06.9) Thyroiditis  Comment:   Plan: TSH with free T4 reflex        Check thyroid with blood tests next year.  Has been OK for the last 4 years.     (I10) Essential hypertension with goal blood pressure less than 140/90  Comment: doing well  Plan: doxazosin (CARDURA) 4 MG tablet, losartan         (COZAAR) 100 MG tablet, metoprolol tartrate         (LOPRESSOR) 100 MG tablet        No change in current treatment plan.  Refills.     (N40.1) Benign non-nodular prostatic hyperplasia with lower urinary tract symptoms  Comment: stable  Plan: doxazosin (CARDURA) 4 MG tablet        No change in current treatment plan.      End of Life Planning:  Patient currently has an advanced directive: No.  I have verified the patient's ablity to prepare an advanced directive/make health care decisions.  Literature was provided to assist patient in preparing an advanced directive.    COUNSELING:    Estimated body mass index is 26.05 kg/m  as calculated from the following:    Height as of this encounter: 1.765 m (5' 9.5\").    Weight as of this encounter: 81.2 kg (179 lb).         reports that he has never smoked. He has never used smokeless tobacco.      Appropriate preventive services were discussed with this patient, including applicable screening as appropriate for cardiovascular disease, diabetes, osteopenia/osteoporosis, and glaucoma.  As appropriate for age/gender, discussed screening for colorectal cancer, prostate cancer, breast cancer, and cervical cancer. Checklist reviewing preventive services available has been given to the patient.    Reviewed patients plan of care and provided an AVS. The Basic Care Plan (routine screening as documented in Health Maintenance) for Dave meets the Care Plan requirement. This Care Plan has been established and reviewed with the Patient.    Counseling " Resources:  ATP IV Guidelines  Pooled Cohorts Equation Calculator  Breast Cancer Risk Calculator  FRAX Risk Assessment  ICSI Preventive Guidelines  Dietary Guidelines for Americans, 2010  USDA's MyPlate  ASA Prophylaxis  Lung CA Screening    Raymon Huang MD  [facility]    Identified Health Risks:

## 2019-08-05 DIAGNOSIS — I25.10 ASCVD (ARTERIOSCLEROTIC CARDIOVASCULAR DISEASE): ICD-10-CM

## 2019-08-05 NOTE — TELEPHONE ENCOUNTER
"Requested Prescriptions   Pending Prescriptions Disp Refills     hydrochlorothiazide (HYDRODIURIL) 12.5 MG tablet 90 tablet 3     Sig: Take 1 tablet (12.5 mg) by mouth daily   Last Written Prescription Date:  7/19/19  Last Fill Quantity: 90 tab,  # refills: 3   Last office visit: 7/19/2019 with prescribing provider: Raymon Huang  Future Office Visit:        Diuretics (Including Combos) Protocol Passed - 8/5/2019  3:58 PM        Passed - Blood pressure under 140/90 in past 12 months     BP Readings from Last 3 Encounters:   07/19/19 138/72   07/16/19 138/73   07/26/18 116/68                 Passed - Recent (12 mo) or future (30 days) visit within the authorizing provider's specialty     Patient had office visit in the last 12 months or has a visit in the next 30 days with authorizing provider or within the authorizing provider's specialty.  See \"Patient Info\" tab in inbasket, or \"Choose Columns\" in Meds & Orders section of the refill encounter.              Passed - Medication is active on med list        Passed - Patient is age 18 or older        Passed - Normal serum creatinine on file in past 12 months     Recent Labs   Lab Test 07/11/19  0820   CR 0.96              Passed - Normal serum potassium on file in past 12 months     Recent Labs   Lab Test 07/11/19  0820   POTASSIUM 4.2                    Passed - Normal serum sodium on file in past 12 months     Recent Labs   Lab Test 07/11/19  0820                   "

## 2019-08-06 RX ORDER — HYDROCHLOROTHIAZIDE 12.5 MG/1
12.5 TABLET ORAL DAILY
Qty: 90 TABLET | Refills: 3 | OUTPATIENT
Start: 2019-08-06

## 2019-11-03 ENCOUNTER — HEALTH MAINTENANCE LETTER (OUTPATIENT)
Age: 79
End: 2019-11-03

## 2020-06-07 ENCOUNTER — MYC MEDICAL ADVICE (OUTPATIENT)
Dept: FAMILY MEDICINE | Facility: CLINIC | Age: 80
End: 2020-06-07

## 2020-06-08 RX ORDER — AMINO ACIDS/MV,IRON,MIN
1 TABLET ORAL DAILY
Qty: 30 TABLET | Refills: 0 | COMMUNITY
Start: 2020-06-08

## 2020-06-25 DIAGNOSIS — E06.9 THYROIDITIS: ICD-10-CM

## 2020-06-25 DIAGNOSIS — I25.10 CORONARY ARTERY DISEASE INVOLVING NATIVE HEART WITHOUT ANGINA PECTORIS, UNSPECIFIED VESSEL OR LESION TYPE: ICD-10-CM

## 2020-06-25 LAB
ALT SERPL W P-5'-P-CCNC: 45 U/L (ref 0–70)
ANION GAP SERPL CALCULATED.3IONS-SCNC: 3 MMOL/L (ref 3–14)
BUN SERPL-MCNC: 25 MG/DL (ref 7–30)
CALCIUM SERPL-MCNC: 9.5 MG/DL (ref 8.5–10.1)
CHLORIDE SERPL-SCNC: 104 MMOL/L (ref 94–109)
CHOLEST SERPL-MCNC: 120 MG/DL
CO2 SERPL-SCNC: 30 MMOL/L (ref 20–32)
CREAT SERPL-MCNC: 1.03 MG/DL (ref 0.66–1.25)
GFR SERPL CREATININE-BSD FRML MDRD: 69 ML/MIN/{1.73_M2}
GLUCOSE SERPL-MCNC: 110 MG/DL (ref 70–99)
HDLC SERPL-MCNC: 51 MG/DL
LDLC SERPL CALC-MCNC: 54 MG/DL
NONHDLC SERPL-MCNC: 69 MG/DL
POTASSIUM SERPL-SCNC: 4 MMOL/L (ref 3.4–5.3)
SODIUM SERPL-SCNC: 137 MMOL/L (ref 133–144)
TRIGL SERPL-MCNC: 74 MG/DL
TSH SERPL DL<=0.005 MIU/L-ACNC: 1.31 MU/L (ref 0.4–4)

## 2020-06-25 PROCEDURE — 36415 COLL VENOUS BLD VENIPUNCTURE: CPT | Performed by: FAMILY MEDICINE

## 2020-06-25 PROCEDURE — 84443 ASSAY THYROID STIM HORMONE: CPT | Performed by: FAMILY MEDICINE

## 2020-06-25 PROCEDURE — 80061 LIPID PANEL: CPT | Performed by: INTERNAL MEDICINE

## 2020-06-25 PROCEDURE — 84460 ALANINE AMINO (ALT) (SGPT): CPT | Performed by: INTERNAL MEDICINE

## 2020-06-25 PROCEDURE — 80048 BASIC METABOLIC PNL TOTAL CA: CPT | Performed by: INTERNAL MEDICINE

## 2020-06-25 NOTE — RESULT ENCOUNTER NOTE
Results noted: lipids stable and at goal; ALT WNL. To be discussed at [facility] with Dr Hernandez on 7/2/20

## 2020-06-26 NOTE — RESULT ENCOUNTER NOTE
[place],  TSH is normal indicating that the level of thyroid hormone is in the normal range.   KERON ESCOBEDO MD

## 2020-07-02 ENCOUNTER — VIRTUAL VISIT (OUTPATIENT)
Dept: CARDIOLOGY | Facility: CLINIC | Age: 80
End: 2020-07-02
Attending: INTERNAL MEDICINE
Payer: COMMERCIAL

## 2020-07-02 VITALS — DIASTOLIC BLOOD PRESSURE: 67 MMHG | SYSTOLIC BLOOD PRESSURE: 131 MMHG

## 2020-07-02 DIAGNOSIS — I25.10 CORONARY ARTERY DISEASE INVOLVING NATIVE HEART WITHOUT ANGINA PECTORIS, UNSPECIFIED VESSEL OR LESION TYPE: ICD-10-CM

## 2020-07-02 PROCEDURE — 99213 OFFICE O/P EST LOW 20 MIN: CPT | Mod: 95 | Performed by: INTERNAL MEDICINE

## 2020-07-02 NOTE — LETTER
7/2/2020    Raymon Huang MD  [address] [place] [id number]    RE: Dave Spann       Dear Colleague,    I had the pleasure of seeing Dave Spann in the [facility].    Dave Spann is a 79 year old male who is being evaluated via a billable telephone visit.       Cardiology     I had the pleasure to follow up with your patient, Mr. Dave Spann, in the Cardiovascular Medicine Clinic via phone visit.  As you recall, this is a gentleman who in Feb 2015 was transferred to [facility] for coronary angiogram.  Briefly, this study was performed due to chest discomfort with exertion.  There was concern regarding this representing a high-grade coronary artery lesion.  He underwent coronary angiogram via right radial approach demonstrating intermediate disease in the LAD which was evaluated by fractional flow reserve assessment.  This, fortunately, was 0.84 suggestive of nonobstructive disease.  He also was uncovered to have hyperthyroidism and has been maintained on beta blocker therapy since that time.   From a cardiac perspective, he reports no additional symptoms.  He has gone back to his normal level of functioning.  In the winter months, he is quite active in the garage.  He likes woodworking and in the summer he has a large garden which he tends to.      Had thyroiditis which has since resolved.    He has no active cardiac complaints. LE edema with Norvasc which has been discontinued.  Has done well with hydrochlorothiazide.       Echocardiogram: July 2018  Essentially normal study for age.  The visual ejection fraction is estimated at 55-60%.  Normal left ventricular wall motion  The right ventricle is normal in size and function.  Compared to prior study, there is no significant change.    PHYSICAL EXAMINATION:   VITAL SIGNS:  Blood pressure 131/67.         ASSESSMENT AND PLAN:   1. Atherosclerotic coronary disease, mild, nonobstructive.   2.  Hypertension.   3. Hyperlipidemia.      RECOMMENDATIONS:   1. Atherosclerotic coronary artery disease.  No return of symptoms  2. Hyperlipidemia.  Let us continue with his atorvastatin at goal  3. HTN:  Stable (had LE edema on Norvasc)  4. RTC in one year with pre clinic lab work and echocardiogram    Thank you for allowing me to participate in the care of your patient.    Sincerely,     Vijaya Hernandez MD     [facility]

## 2020-07-02 NOTE — PROGRESS NOTES
"Dave Spann is a 79 year old male who is being evaluated via a billable telephone visit.      The patient has been notified of following:     \"This telephone visit will be conducted via a call between you and your physician/provider. We have found that certain health care needs can be provided without the need for a physical exam.  This service lets us provide the care you need with a short phone conversation.  If a prescription is necessary we can send it directly to your pharmacy.  If lab work is needed we can place an order for that and you can then stop by our lab to have the test done at a later time.    Telephone visits are billed at different rates depending on your insurance coverage. During this emergency period, for some insurers they may be billed the same as an in-person visit.  Please reach out to your insurance provider with any questions.    If during the course of the call the physician/provider feels a telephone visit is not appropriate, you will not be charged for this service.\"    Patient has given verbal consent for Telephone visit?  Yes    What phone number would you like to be contacted at? [phone]    How would you like to obtain your AVS? MyChart    Phone call duration: 14 minutes    Vijaya Hernandez MD       Cardiology     I had the pleasure to follow up with your patient, Mr. Dave Spann, in the Cardiovascular Medicine Clinic via phone visit.  As you recall, this is a gentleman who in Feb 2015 was transferred to [facility] for coronary angiogram.  Briefly, this study was performed due to chest discomfort with exertion.  There was concern regarding this representing a high-grade coronary artery lesion.  He underwent coronary angiogram via right radial approach demonstrating intermediate disease in the LAD which was evaluated by fractional flow reserve assessment.  This, fortunately, was 0.84 suggestive of nonobstructive disease.  He also was uncovered to have " hyperthyroidism and has been maintained on beta blocker therapy since that time.   From a cardiac perspective, he reports no additional symptoms.  He has gone back to his normal level of functioning.  In the winter months, he is quite active in the garage.  He likes woodworking and in the summer he has a large garden which he tends to.      Had thyroiditis which has since resolved.    He has no active cardiac complaints. LE edema with Norvasc which has been discontinued.  Has done well with hydrochlorothiazide.       Echocardiogram: July 2018  Essentially normal study for age.  The visual ejection fraction is estimated at 55-60%.  Normal left ventricular wall motion  The right ventricle is normal in size and function.  Compared to prior study, there is no significant change.    PHYSICAL EXAMINATION:   VITAL SIGNS:  Blood pressure 131/67.         ASSESSMENT AND PLAN:   1. Atherosclerotic coronary disease, mild, nonobstructive.   2. Hypertension.   3. Hyperlipidemia.      RECOMMENDATIONS:   1. Atherosclerotic coronary artery disease.  No return of symptoms  2. Hyperlipidemia.  Let us continue with his atorvastatin at goal  3. HTN:  Stable (had LE edema on Norvasc)  4. RTC in one year with pre clinic lab work and echocardiogram    Vijaya Hernandez MD

## 2020-07-15 ASSESSMENT — ENCOUNTER SYMPTOMS
CONSTIPATION: 1
DIZZINESS: 0
PALPITATIONS: 0
HEARTBURN: 0
WEAKNESS: 0
SORE THROAT: 0
COUGH: 0
HEADACHES: 0
SHORTNESS OF BREATH: 0
ABDOMINAL PAIN: 0
CHILLS: 0
DYSURIA: 0
HEMATURIA: 0
PARESTHESIAS: 0
DIARRHEA: 0
NERVOUS/ANXIOUS: 0
ARTHRALGIAS: 0
FEVER: 0
EYE PAIN: 0
NAUSEA: 0
FREQUENCY: 0
HEMATOCHEZIA: 0
MYALGIAS: 1
JOINT SWELLING: 0

## 2020-07-15 ASSESSMENT — ACTIVITIES OF DAILY LIVING (ADL): CURRENT_FUNCTION: NO ASSISTANCE NEEDED

## 2020-07-16 ENCOUNTER — OFFICE VISIT (OUTPATIENT)
Dept: FAMILY MEDICINE | Facility: CLINIC | Age: 80
End: 2020-07-16
Payer: COMMERCIAL

## 2020-07-16 VITALS
WEIGHT: 176 LBS | TEMPERATURE: 97.5 F | BODY MASS INDEX: 26.07 KG/M2 | HEIGHT: 69 IN | SYSTOLIC BLOOD PRESSURE: 120 MMHG | RESPIRATION RATE: 18 BRPM | DIASTOLIC BLOOD PRESSURE: 70 MMHG | OXYGEN SATURATION: 98 % | HEART RATE: 58 BPM

## 2020-07-16 DIAGNOSIS — I10 ESSENTIAL HYPERTENSION WITH GOAL BLOOD PRESSURE LESS THAN 140/90: ICD-10-CM

## 2020-07-16 DIAGNOSIS — N40.1 BENIGN NON-NODULAR PROSTATIC HYPERPLASIA WITH LOWER URINARY TRACT SYMPTOMS: ICD-10-CM

## 2020-07-16 DIAGNOSIS — E78.5 HYPERLIPIDEMIA LDL GOAL <130: ICD-10-CM

## 2020-07-16 DIAGNOSIS — G89.29 CHRONIC BILATERAL LOW BACK PAIN WITHOUT SCIATICA: ICD-10-CM

## 2020-07-16 DIAGNOSIS — I25.10 ASCVD (ARTERIOSCLEROTIC CARDIOVASCULAR DISEASE): ICD-10-CM

## 2020-07-16 DIAGNOSIS — Z00.00 MEDICARE ANNUAL WELLNESS VISIT, SUBSEQUENT: Primary | ICD-10-CM

## 2020-07-16 DIAGNOSIS — M54.50 CHRONIC BILATERAL LOW BACK PAIN WITHOUT SCIATICA: ICD-10-CM

## 2020-07-16 PROCEDURE — 99214 OFFICE O/P EST MOD 30 MIN: CPT | Mod: 25 | Performed by: FAMILY MEDICINE

## 2020-07-16 PROCEDURE — 99397 PER PM REEVAL EST PAT 65+ YR: CPT | Performed by: FAMILY MEDICINE

## 2020-07-16 RX ORDER — DOXAZOSIN 4 MG/1
TABLET ORAL
Qty: 90 TABLET | Refills: 3 | Status: SHIPPED | OUTPATIENT
Start: 2020-07-16

## 2020-07-16 RX ORDER — HYDROCHLOROTHIAZIDE 12.5 MG/1
12.5 TABLET ORAL DAILY
Qty: 90 TABLET | Refills: 3 | Status: SHIPPED | OUTPATIENT
Start: 2020-07-16

## 2020-07-16 RX ORDER — ATORVASTATIN CALCIUM 40 MG/1
TABLET, FILM COATED ORAL
Qty: 90 TABLET | Refills: 3 | Status: SHIPPED | OUTPATIENT
Start: 2020-07-16

## 2020-07-16 RX ORDER — LOSARTAN POTASSIUM 100 MG/1
100 TABLET ORAL DAILY
Qty: 90 TABLET | Refills: 3 | Status: SHIPPED | OUTPATIENT
Start: 2020-07-16

## 2020-07-16 RX ORDER — METOPROLOL TARTRATE 100 MG
100 TABLET ORAL 2 TIMES DAILY
Qty: 180 TABLET | Refills: 3 | Status: SHIPPED | OUTPATIENT
Start: 2020-07-16

## 2020-07-16 ASSESSMENT — ENCOUNTER SYMPTOMS
HEADACHES: 0
FEVER: 0
PARESTHESIAS: 0
CHILLS: 0
COUGH: 0
WEAKNESS: 0
DYSURIA: 0
HEMATOCHEZIA: 0
SHORTNESS OF BREATH: 0
CONSTIPATION: 1
MYALGIAS: 1
JOINT SWELLING: 0
DIARRHEA: 0
NERVOUS/ANXIOUS: 0
ARTHRALGIAS: 0
NAUSEA: 0
SORE THROAT: 0
EYE PAIN: 0
FREQUENCY: 0
HEARTBURN: 0
ABDOMINAL PAIN: 0
DIZZINESS: 0
HEMATURIA: 0
PALPITATIONS: 0

## 2020-07-16 ASSESSMENT — ACTIVITIES OF DAILY LIVING (ADL): CURRENT_FUNCTION: NO ASSISTANCE NEEDED

## 2020-07-16 ASSESSMENT — MIFFLIN-ST. JEOR: SCORE: 1513.71

## 2020-07-16 NOTE — NURSING NOTE
"Chief Complaint   Patient presents with     Physical       Initial /70   Pulse 58   Temp 97.5  F (36.4  C) (Tympanic)   Resp 18   Ht 1.753 m (5' 9\")   Wt 79.8 kg (176 lb)   SpO2 98%   BMI 25.99 kg/m   Estimated body mass index is 25.99 kg/m  as calculated from the following:    Height as of this encounter: 1.753 m (5' 9\").    Weight as of this encounter: 79.8 kg (176 lb).    Patient presents to the clinic using     Health Maintenance that is potentially due pending provider review:          Is there anyone who you would like to be able to receive your results?   If yes have patient fill out PAULETTE    "

## 2020-07-16 NOTE — PROGRESS NOTES
"SUBJECTIVE:   Dave Spann is a 79 year old male who presents for Preventive Visit.  Chief Complaint   Patient presents with     Physical    Last clinic visit: 7/19/2019 wellness  Are you in the first 12 months of your Medicare coverage?  No  Needs prescription.   Some bilateral low back pain.  Worse with standing.  Has been present for a year.  No injury.  Can go to left leg.    NOt below knee.  OK when pushing shopping cart.   No weakness or numbness/tingling in legs.    Hypertension:  Checking at home,. Past month=122-131/67-78.  Taking losartan 100mg daily, hydrochlorothiazide 12.5mg daily and metoprolol 100mg twice daily.  Takes doxazosin for blood pressure and prostate.   Hyperlipidemia: taking atorvastatin 40 mg daily.    BPH:doing OK.  NocturiaX2.  Unchanged.   No other pains.     Healthy Habits:     In general, how would you rate your overall health?  Good    Frequency of exercise:  None    Do you usually eat at least 4 servings of fruit and vegetables a day, include whole grains    & fiber and avoid regularly eating high fat or \"junk\" foods?  Yes    Taking medications regularly:  Yes    Medication side effects:  None    Ability to successfully perform activities of daily living:  No assistance needed    Home Safety:  Lack of grab bars in the bathroom    Hearing Impairment:  Difficulty following a conversation in a noisy restaurant or crowded room and difficult to understand a speaker at a public meeting or Gnosticism service    In the past 6 months, have you been bothered by leaking of urine?  No    In general, how would you rate your overall mental or emotional health?  Good      PHQ-2 Total Score: 0    Additional concerns today:  Yes  Hearing not too bad. Not needing evaluation.   Exercise:none    Do you feel safe in your environment? Yes    Have you ever done Advance Care Planning? (For example, a Health Directive, POLST, or a discussion with a medical provider or your loved ones about your wishes): " No, advance care planning information given to patient to review.  Patient declined advance care planning discussion at this time.    Fall risk  Fallen 2 or more times in the past year?: No  Any fall with injury in the past year?: No    Cognitive Screening   1) Repeat 3 items (Leader, Season, Table)    2) Clock draw: NORMAL  3) 3 item recall: Recalls 3 objects  Results: 3 items recalled: COGNITIVE IMPAIRMENT LESS LIKELY    Mini-CogTM Copyright S Lokesh. Licensed by the author for use in [facility]; reprinted with permission (tomasz@[facility]). All rights reserved.      Do you have sleep apnea, excessive snoring or daytime drowsiness?: no    Social History     Tobacco Use     Smoking status: Never Smoker     Smokeless tobacco: Never Used   Substance Use Topics     Alcohol use: Yes     Comment: One beer per year; sacramental wine     If you drink alcohol do you typically have >3 drinks per day or >7 drinks per week? Yes    Alcohol Use 7/15/2020   Prescreen: >3 drinks/day or >7 drinks/week? Not Applicable   Prescreen: >3 drinks/day or >7 drinks/week? -   No flowsheet data found.      Current providers sharing in care for this patient include:   Patient Care Team:  Raymon Huang MD as PCP - General (Family Practice)  Raymon Huang MD as Assigned PCP   Cardiology.  Last visit virtual visit  On 7/2.  No changes made.     The following health maintenance items are reviewed in Epic and correct as of today:  Health Maintenance   Topic Date Due     HF ACTION PLAN  1940     CBC  07/16/2019     PHQ-2  01/01/2020     ADVANCE CARE PLANNING  02/19/2020     MEDICARE ANNUAL WELLNESS VISIT  07/19/2020     FALL RISK ASSESSMENT  07/19/2020     INFLUENZA VACCINE (1) 09/01/2020     BMP  12/25/2020     ALT  06/25/2021     LIPID  06/25/2021     DTAP/TDAP/TD IMMUNIZATION (5 - Td) 07/20/2028     TSH W/FREE T4 REFLEX  Completed     PNEUMOCOCCAL IMMUNIZATION 65+ LOW/MEDIUM RISK  Completed     ZOSTER IMMUNIZATION   Completed     IPV IMMUNIZATION  Aged Out     MENINGITIS IMMUNIZATION  Aged Out     HEPATITIS B IMMUNIZATION  Aged Out     Patient Active Problem List    Diagnosis Date Noted     Thyroiditis 07/22/2015     Priority: Medium     He had episode thyroiditis with hyperthyroid in February, 2015 which seemed to resolve.        CHF (congestive heart failure) (H) 06/05/2015     Priority: Medium     Tachycardia 02/21/2015     Priority: Medium     ASCVD (arteriosclerotic cardiovascular disease) 02/20/2015     Priority: Medium     2/20/2016:Acute coronary syndrome:[facility] he had a cardiac cath, which he underwent on 2/20 and showed 50% LAD ostial lesion that will be treated medically.       Cataract, right eye 07/22/2013     Priority: Medium     Prediabetes 07/19/2012     Priority: Medium     HYPERLIPIDEMIA LDL GOAL <130 10/31/2010     Priority: Medium     BPH (benign prostatic hypertrophy) 09/15/2010     Priority: Medium     OA (osteoarthritis) 07/15/2010     Priority: Medium     little fingers       Delayed gastric emptying 05/17/2010     Priority: Medium     Adrenal adenoma 12/07/2009     Priority: Medium     CT 8/04/09:  There is a 1.9 cm benign adrenal adenoma on the right.        Hypertension goal BP (blood pressure) < 140/90 07/08/2008     Priority: Medium     Lipoma of other specified sites 07/27/2006     Priority: Medium     right upper back-no changes for years       TINEA VERSICOLOR 07/27/2006     Priority: Medium     trunk       Advanced directives, counseling/discussion 07/19/2011     Priority: Low     Discussed advance care planning with patient; information given to patient to review. 7/19/2011          Health Care Home 07/19/2011     Priority: Low     hypertension treatment and monitoring  Annual exams.  DX V65.8 REPLACED WITH 72930 HEALTH CARE HOME (04/08/2013)          Family history:  CV disease: brother  Prostate cancer: no  Colon cancer: MGF colon cancer.     Multivitamin or Vit D use: see Epic.  "    Vaccines:current     Past Colon cancer screenin      Review of Systems   Constitutional: Negative for chills and fever.   HENT: Positive for hearing loss. Negative for congestion, ear pain and sore throat.    Eyes: Negative for pain and visual disturbance.   Respiratory: Negative for cough and shortness of breath.    Cardiovascular: Negative for chest pain, palpitations and peripheral edema.   Gastrointestinal: Positive for constipation. Negative for abdominal pain, diarrhea, heartburn, hematochezia and nausea.   Genitourinary: Negative for discharge, dysuria, frequency, genital sores, hematuria, impotence and urgency.   Musculoskeletal: Positive for myalgias. Negative for arthralgias and joint swelling.   Skin: Negative for rash.   Neurological: Negative for dizziness, weakness, headaches and paresthesias.   Psychiatric/Behavioral: Negative for mood changes. The patient is not nervous/anxious.    Not taking medication for constipation.     OBJECTIVE:                                                    OBJECTIVE:Blood pressure 120/70, pulse 58, temperature 97.5  F (36.4  C), temperature source Tympanic, resp. rate 18, height 1.753 m (5' 9\"), weight 79.8 kg (176 lb), SpO2 98 %. BMI=Body mass index is 25.99 kg/m .  GENERAL APPEARANCE ADULT: Alert, no acute distress, healthy appearance  EYES: PERRL, EOM normal, conjunctiva and lids normal  HENT: Ears and TMs normal, oral mucosa and posterior oropharynx normal  NECK: No adenopathy,masses or thyromegaly  RESP: lungs clear to auscultation   CV: normal rate, regular rhythm, no murmur or gallop  ABDOMEN: soft, no organomegaly, masses or tenderness  MS: extremities normal, no peripheral edema  back exam: normal posture, shoulders, inferior scapular borders and hips even and symmetrical, ROM full.  Non-tender.  A little pain with rotation to right side.     ASSESSMENT/PLAN:                                                    Lifestyle recommendations:continue current " healthy lifestyle efforts including regular exercise and keeping a good weight  The following exams/tests were recommended and discussed for health maintenance:  Prostate cancer screening is not recommended for older men, those age 70 and older or with anticipated lifespan <10 years.  The expert group USPSTF rcommmends against any PSA prostate cancer screening.      (Z00.00) Medicare annual wellness visit, subsequent  (primary encounter diagnosis)    (I10) Essential hypertension with goal blood pressure less than 140/90  Comment: doing well  Plan: doxazosin (CARDURA) 4 MG tablet, losartan         (COZAAR) 100 MG tablet, metoprolol tartrate         (LOPRESSOR) 100 MG tablet        No change in current treatment plan.  Refills.   Monitor BP periodically.  This can be done at home, in clinic, at our pharmacy, at a store or by neighbor or relative with a blood pressure cuff.  You should be sitting and relaxed for several minutes when taking blood pressure.   Goal BP (most readings) should be less than 140/90    Normal blood pressure is 120/80.    If your blood pressure is consistently at or above the goal, some changes should be made with lifestyle or medication to keep blood pressure under good control.    High blood pressure over time can lead to eye and kidney damage and increase risk for heart attacks and strokes.   Let us know if readings are often high, so we can help get your blood pressure under good control.      (N40.1) Benign non-nodular prostatic hyperplasia with lower urinary tract symptoms  Comment: doing OK  Plan: doxazosin (CARDURA) 4 MG tablet        No change in current treatment plan.   Refills.     (E78.5) Hyperlipidemia LDL goal <130  Comment: doing well  Plan: atorvastatin (LIPITOR) 40 MG tablet        No change in current treatment plan.   Refills     (I25.10) ASCVD (arteriosclerotic cardiovascular disease)  Comment: stabe  Plan: hydrochlorothiazide (HYDRODIURIL) 12.5 MG         tablet        No  "change in current treatment plan.     (M54.5,  G89.29) Chronic bilateral low back pain without sciatica  Comment: muscular-ligamentous   Plan: Back exercises including stretches can be helpful.   Heat or ice.   Consider physical therapy if not improving.  Call if you would like a referral.   OK for medication if needed.      Pain and anti-inflammatory medication options:  Ibuprofen 200mg OTC may be taken up to 4 pills 4 times a day to the maximum of 3200mg or 16 pills daily as needed for pain.     Naproxen (Aleve) OTC may be taken up to 2 pills 2 times a day to the maximum of 4 pills daily as needed for pain.     Aspirin may be taken up to 2-3 pills every 4 hours as needed for pain.     Take only one type of these at a time and take with food as they all can irritate stomach and cause ulcers.      Other pain medication:  Acetaminophen (Tylenol) may be taken as needed for pain and fever.  The maximum doses are listed below, around 3000mg a day.  Regular strength pills are 325mg.  The maximum daily dose is two pills (650mg) every 4 to 6 hours up to 3250mg a day.   Extra strength pills are 500mg each.  The maximum dose is two pills (1000mg) every 6 hours as needed up to 3000mg a day.   Extended release pills are 650mg each.  The maximum dose is two pills (1300mg) every 8 hours as needed up to 3900mg a day.     Watch out for acetaminophen in other over the counter or prescription medications.      Too much acetaminophen can lead to serious liver damage.  It is OK to take acetaminophen with above anti-inflammatory medications.       COUNSELING:      Estimated body mass index is 25.99 kg/m  as calculated from the following:    Height as of this encounter: 1.753 m (5' 9\").    Weight as of this encounter: 79.8 kg (176 lb).    Weight management plan: Discussed healthy diet and exercise guidelines     reports that he has never smoked. He has never used smokeless tobacco.    Appropriate preventive services were discussed with " this patient, including applicable screening as appropriate for cardiovascular disease, diabetes, osteopenia/osteoporosis, and glaucoma.  As appropriate for age/gender, discussed screening for colorectal cancer, prostate cancer, breast cancer, and cervical cancer. Checklist reviewing preventive services available has been given to the patient.    Reviewed patients plan of care and provided an AVS. The Basic Care Plan (routine screening as documented in Health Maintenance) for Dave meets the Care Plan requirement. This Care Plan has been established and reviewed with the Patient.    Counseling Resources:  ATP IV Guidelines  Pooled Cohorts Equation Calculator  Breast Cancer Risk Calculator  FRAX Risk Assessment  ICSI Preventive Guidelines  Dietary Guidelines for Americans, 2010  USDA's MyPlate  ASA Prophylaxis  Lung CA Screening    Raymon Huang MD  [facility]    Identified Health Risks:

## 2020-07-16 NOTE — PATIENT INSTRUCTIONS
ASSESSMENT/PLAN:                                                    Lifestyle recommendations:continue current healthy lifestyle efforts including regular exercise and keeping a good weight  The following exams/tests were recommended and discussed for health maintenance:  Prostate cancer screening is not recommended for older men, those age 70 and older or with anticipated lifespan <10 years.  The expert group [employer] rcommmends against any PSA prostate cancer screening.      (Z00.00) Medicare annual wellness visit, subsequent  (primary encounter diagnosis)    (I10) Essential hypertension with goal blood pressure less than 140/90  Comment: doing well  Plan: doxazosin (CARDURA) 4 MG tablet, losartan         (COZAAR) 100 MG tablet, metoprolol tartrate         (LOPRESSOR) 100 MG tablet        No change in current treatment plan.  Refills.   Monitor BP periodically.  This can be done at home, in clinic, at our pharmacy, at a store or by neighbor or relative with a blood pressure cuff.  You should be sitting and relaxed for several minutes when taking blood pressure.   Goal BP (most readings) should be less than 140/90    Normal blood pressure is 120/80.    If your blood pressure is consistently at or above the goal, some changes should be made with lifestyle or medication to keep blood pressure under good control.    High blood pressure over time can lead to eye and kidney damage and increase risk for heart attacks and strokes.   Let us know if readings are often high, so we can help get your blood pressure under good control.      (N40.1) Benign non-nodular prostatic hyperplasia with lower urinary tract symptoms  Comment: doing OK  Plan: doxazosin (CARDURA) 4 MG tablet        No change in current treatment plan.   Refills.     (E78.5) Hyperlipidemia LDL goal <130  Comment: doing well  Plan: atorvastatin (LIPITOR) 40 MG tablet        No change in current treatment plan.   Refills     (I25.10) ASCVD (arteriosclerotic  cardiovascular disease)  Comment: stabe  Plan: hydrochlorothiazide (HYDRODIURIL) 12.5 MG         tablet        No change in current treatment plan.     (M54.5,  G89.29) Chronic bilateral low back pain without sciatica  Comment: muscular-ligamentous   Plan: Back exercises including stretches can be helpful.   Heat or ice.   Consider physical therapy if not improving.  Call if you would like a referral.   OK for medication if needed.      Pain and anti-inflammatory medication options:  Ibuprofen 200mg OTC may be taken up to 4 pills 4 times a day to the maximum of 3200mg or 16 pills daily as needed for pain.     Naproxen (Aleve) OTC may be taken up to 2 pills 2 times a day to the maximum of 4 pills daily as needed for pain.     Aspirin may be taken up to 2-3 pills every 4 hours as needed for pain.     Take only one type of these at a time and take with food as they all can irritate stomach and cause ulcers.      Other pain medication:  Acetaminophen (Tylenol) may be taken as needed for pain and fever.  The maximum doses are listed below, around 3000mg a day.  Regular strength pills are 325mg.  The maximum daily dose is two pills (650mg) every 4 to 6 hours up to 3250mg a day.   Extra strength pills are 500mg each.  The maximum dose is two pills (1000mg) every 6 hours as needed up to 3000mg a day.   Extended release pills are 650mg each.  The maximum dose is two pills (1300mg) every 8 hours as needed up to 3900mg a day.     Watch out for acetaminophen in other over the counter or prescription medications.      Too much acetaminophen can lead to serious liver damage.  It is OK to take acetaminophen with above anti-inflammatory medications.

## 2020-07-16 NOTE — Clinical Note
Mk Johansen.  This gentleman was in for wellness. I reviewed chronic problems and reordered meds.  NO changes made.  OK for adding E3 visit?  Thanks, Phan

## 2021-09-18 ENCOUNTER — HEALTH MAINTENANCE LETTER (OUTPATIENT)
Age: 81
End: 2021-09-18

## 2022-11-19 ENCOUNTER — HEALTH MAINTENANCE LETTER (OUTPATIENT)
Age: 82
End: 2022-11-19

## 2023-09-10 ENCOUNTER — HEALTH MAINTENANCE LETTER (OUTPATIENT)
Age: 83
End: 2023-09-10

## (undated) RX ORDER — GLYCOPYRROLATE 0.2 MG/ML
INJECTION, SOLUTION INTRAMUSCULAR; INTRAVENOUS
Status: DISPENSED
Start: 2017-09-14